# Patient Record
Sex: FEMALE | Race: WHITE
[De-identification: names, ages, dates, MRNs, and addresses within clinical notes are randomized per-mention and may not be internally consistent; named-entity substitution may affect disease eponyms.]

---

## 2018-07-29 ENCOUNTER — HOSPITAL ENCOUNTER (INPATIENT)
Dept: HOSPITAL 45 - C.EDB | Age: 58
LOS: 6 days | Discharge: HOME | DRG: 314 | End: 2018-08-04
Attending: HOSPITALIST | Admitting: HOSPITALIST
Payer: COMMERCIAL

## 2018-07-29 VITALS
BODY MASS INDEX: 45.99 KG/M2 | BODY MASS INDEX: 45.99 KG/M2 | BODY MASS INDEX: 45.99 KG/M2 | WEIGHT: 293 LBS | BODY MASS INDEX: 45.99 KG/M2 | HEIGHT: 67 IN | WEIGHT: 293 LBS | HEIGHT: 67 IN

## 2018-07-29 DIAGNOSIS — I26.99: ICD-10-CM

## 2018-07-29 DIAGNOSIS — I13.0: ICD-10-CM

## 2018-07-29 DIAGNOSIS — J96.22: ICD-10-CM

## 2018-07-29 DIAGNOSIS — J44.1: ICD-10-CM

## 2018-07-29 DIAGNOSIS — E11.22: ICD-10-CM

## 2018-07-29 DIAGNOSIS — R13.10: ICD-10-CM

## 2018-07-29 DIAGNOSIS — N18.3: ICD-10-CM

## 2018-07-29 DIAGNOSIS — Z91.19: ICD-10-CM

## 2018-07-29 DIAGNOSIS — F17.200: ICD-10-CM

## 2018-07-29 DIAGNOSIS — J96.21: ICD-10-CM

## 2018-07-29 DIAGNOSIS — I27.29: ICD-10-CM

## 2018-07-29 DIAGNOSIS — I27.24: Primary | ICD-10-CM

## 2018-07-29 DIAGNOSIS — Z51.81: ICD-10-CM

## 2018-07-29 DIAGNOSIS — I50.813: ICD-10-CM

## 2018-07-29 DIAGNOSIS — E87.5: ICD-10-CM

## 2018-07-29 DIAGNOSIS — E87.2: ICD-10-CM

## 2018-07-29 DIAGNOSIS — Z86.718: ICD-10-CM

## 2018-07-29 DIAGNOSIS — Z79.899: ICD-10-CM

## 2018-07-29 DIAGNOSIS — R53.81: ICD-10-CM

## 2018-07-29 DIAGNOSIS — Z82.49: ICD-10-CM

## 2018-07-29 DIAGNOSIS — N17.9: ICD-10-CM

## 2018-07-29 DIAGNOSIS — I50.31: ICD-10-CM

## 2018-07-29 DIAGNOSIS — Z83.3: ICD-10-CM

## 2018-07-29 DIAGNOSIS — Z66: ICD-10-CM

## 2018-07-29 DIAGNOSIS — Z79.84: ICD-10-CM

## 2018-07-29 DIAGNOSIS — G89.29: ICD-10-CM

## 2018-07-29 DIAGNOSIS — E66.2: ICD-10-CM

## 2018-07-29 DIAGNOSIS — Z80.3: ICD-10-CM

## 2018-07-29 LAB
BASOPHILS # BLD: 0.04 K/UL (ref 0–0.2)
BASOPHILS NFR BLD: 0.4 %
BUN SERPL-MCNC: 82 MG/DL (ref 7–18)
CALCIUM SERPL-MCNC: 8.9 MG/DL (ref 8.5–10.1)
CO2 SERPL-SCNC: 26 MMOL/L (ref 21–32)
CREAT SERPL-MCNC: 1.84 MG/DL (ref 0.6–1.2)
EOS ABS #: 0.15 K/UL (ref 0–0.5)
EOSINOPHIL NFR BLD AUTO: 274 K/UL (ref 130–400)
GLUCOSE SERPL-MCNC: 99 MG/DL (ref 70–99)
HCT VFR BLD CALC: 48.8 % (ref 37–47)
HGB BLD-MCNC: 15.4 G/DL (ref 12–16)
IG#: 0.03 K/UL (ref 0–0.02)
IMM GRANULOCYTES NFR BLD AUTO: 25.3 %
LYMPHOCYTES # BLD: 2.87 K/UL (ref 1.2–3.4)
MCH RBC QN AUTO: 30.4 PG (ref 25–34)
MCHC RBC AUTO-ENTMCNC: 31.6 G/DL (ref 32–36)
MCV RBC AUTO: 96.3 FL (ref 80–100)
MONO ABS #: 0.95 K/UL (ref 0.11–0.59)
MONOCYTES NFR BLD: 8.4 %
NEUT ABS #: 7.3 K/UL (ref 1.4–6.5)
NEUTROPHILS # BLD AUTO: 1.3 %
NEUTROPHILS NFR BLD AUTO: 64.3 %
PMV BLD AUTO: 9.9 FL (ref 7.4–10.4)
POTASSIUM SERPL-SCNC: 5.9 MMOL/L (ref 3.5–5.1)
RED CELL DISTRIBUTION WIDTH CV: 16.7 % (ref 11.5–14.5)
RED CELL DISTRIBUTION WIDTH SD: 58.3 FL (ref 36.4–46.3)
SODIUM SERPL-SCNC: 137 MMOL/L (ref 136–145)
WBC # BLD AUTO: 11.34 K/UL (ref 4.8–10.8)

## 2018-07-29 NOTE — DIAGNOSTIC IMAGING REPORT
CHEST ONE VIEW PORTABLE



CLINICAL HISTORY: CHEST PAIN dyspnea



COMPARISON STUDY:  No previous studies for comparison.



FINDINGS: Moderate cardiomegaly. Prominent pulmonary vasculature. Diaphragms are

smooth. 



IMPRESSION:  Congestive heart failure 











The above report was generated using voice recognition software.  It may contain

grammatical, syntax or spelling errors.









Electronically signed by:  Robert Thao M.D.

7/29/2018 8:24 PM



Dictated Date/Time:  7/29/2018 8:24 PM

## 2018-07-29 NOTE — EMERGENCY ROOM VISIT NOTE
History


Report prepared by Claudia:  Shen Cardona


Under the Supervision of:  Dr. Jose Antonio Nolasco M.D.


First contact with patient:  19:36


Chief Complaint:  SHORTNESS OF BREATH


Stated Complaint:  SOB,RETAINING FLUID





History of Present Illness


The patient is a 58 year old female who presents to the Emergency Room with 

complaints of worsening SOB that began recently. Patient adds she has been 

weaker than usual lately. She states that has not used oxygen in the past 

couple of weeks since it was not helping her symptoms.  She was prescribed home 

oxygen by her primary care physician in [place] as needed, she does not 

wear oxygen regularly.  Patient adds she has increased "fluid" in her abdomen 

which has "doubled in size" over the past 2 weeks. She states she also has 

generalized pain. Patient adds she takes Metformin, Genvoya, glimepiride, 

Aspirin, Percocet, Torsemide, and Lisinopril. Patient adds she uses Advair, an 

inhaler daily, and daily breathing treatments. She states the breathing 

treatments have not helped her breathing problems. Patient states she has been 

taking all her medications. Patient states she is a smoker. Patient denies ever 

being on a ventilator, intubated, or in the ICU. Patient denies any history of 

liver problems. She denies taking any blood thinners. Patient states she does 

not have a cardiologist. She adds she lives in [place]. Patient states 

she got to [place] 2 weeks ago as a passenger in a car.





   Source of History:  patient


   Onset:  Recent


   Position:  chest


   Timing:  worsening


   Modifying Factors (Relieving):  other (None)


   Associated Symptoms:  + weakness


Note:


Positive abdominal "fluid" and generalized pain.





Review of Systems


See HPI for pertinent positives and negatives.  A total of ten systems were 

reviewed and were otherwise negative.





Past Medical & Surgical


Medical Problems:


(1) Acute CHF


(2) Acute respiratory failure


(3) Asthma


(4) Bronchitis


(5) Diabetes


(6) Emphysema of lung


(7) Hypertension


(8) Pneumonia


(9) Stomach problems








Family History





Cancer


Diabetes mellitus


Heart disease


Hypertension





Social History


Smoking Status:  Current Every Day Smoker


Alcohol Use:  none


Marital Status:  


Occupation Status:  disabled





Current/Historical Medications


Scheduled


Fluticasone-Salmeterol 230/21 Mcg (Advair Hfa 230/21 Mcg), 2 PUFFS INH BID


Glimepiride (Amaryl), 4 MG PO BID


Home O2 Therapy (Oxygen), 2-3 LITERS NA UD


Lisinopril (Zestril), 40 MG PO DAILY


Metformin Hcl (Glucophage), 850 MG PO TID


Sitagliptin Phosphate (Januvia), 100 MG PO DAILY


Umeclidinium Bromide (Incruse Ellipta), 1 INHA INH DAILY





Scheduled PRN


Furosemide (Lasix), 40 MG PO BID PRN for Fluid Retention


Gabapentin (Neurontin), 800 MG PO BID PRN for Pain


Ibuprofen Tab (Motrin), 1,600 MG PO BID PRN for Pain


Oxycodone/Acetaminophen 7.5MG/325MG (Oxycodone/Acetaminophen 7.5MG/325MG), 1 

TAB PO Q8 PRN for Pain





Allergies


Coded Allergies:  


     No Known Allergies (Unverified , 8/14/12)





Physical Exam


Vital Signs











  Date Time  Temp Pulse Resp B/P (MAP) Pulse Ox O2 Delivery O2 Flow Rate FiO2


 


7/29/18 23:31  84 22 129/72 93 Nasal Cannula 6.0 


 


7/29/18 23:01  84  103/65 92 Nasal Cannula 6.0 


 


7/29/18 22:00  81  117/73 92 Nasal Cannula 6.0 


 


7/29/18 21:21  75 23 114/80 93 Nasal Cannula 6.0 


 


7/29/18 20:13  78      


 


7/29/18 19:55     92 Nasal Cannula 6.0 


 


7/29/18 19:55  82 30 110/70 93 Nasal Cannula 6.0 


 


7/29/18 19:52     93 Nasal Cannula 6.0 


 


7/29/18 19:52     93 Nasal Cannula 6.0 


 


7/29/18 19:32 36.8 81 26  83 Room Air  











Physical Exam


Physical Exam 


GENERAL:  She is oriented to person, place, and time.  


HENT:  Exam performed. 


   Head:  Normocephalic and atraumatic. 


   Right Ear:  External ear normal. No mastoid tenderness. 


   Left Ear: External ear normal. No mastoid tenderness. 


   Mouth/Throat:  The oropharynx is clear and moist. No trismus in the jaw. No 

dental abscesses or uvula swelling. No oropharyngeal exudate or tonsillar 

abscesses.


EYES: Conjunctivae and EOM are normal. Pupils are equal, round, and reactive to 

light. Right eye exhibits no discharge. Left eye exhibits no discharge. No 

scleral icterus.


NECK: Normal range of motion. Neck supple. No JVD present. No spinous process 

tenderness present. No carotid bruit present. No rigidity. No tracheal 

deviation and normal range of motion present. No Brudzinski's sign and no Kernig

's sign noted.


CV: Normal rate, regular rhythm, normal heart sounds and intact distal pulses. 

There is no peripheral edema. Palpable radial pulses bue.


PULM/CHEST:  Diffuse expiratory wheezes otherwise effort normal and breath 

sounds normal.  No stridor. She has no rales. 


   Chest Wall:  She exhibits no tenderness.


ABD: Morbidly obese otherwise the abdomen is soft. Bowel sounds are normal. She 

has no distension. No mass is present. There is no tenderness. There is no 

rebound, no guarding, no Gautam's sign and no tenderness at McBurney's point. 

Rovsig negative


MUSC/SKEL: 2+ bilateral pitting edema otherwise normal range of motion. There 

is no tenderness or deformity.


LYMPH: No cervical adenopathy.


NEURO: She is alert and oriented to person, place, and time. She has normal 

strength. No cranial nerve deficit or sensory deficit. Coordination and gait 

normal. GCS eye subscore is 4. GCS verbal subscore is 5. GCS motor subscore is 

6. Cerebellar tests wnl.


SKIN: Skin is warm and dry. She is not diaphoretic.


PSYCH: She has a normal mood and affect. Behavior is normal. Judgment and 

thought content normal.





Medical Decision & Procedures


ER Provider


Diagnostic Interpretation:


Radiology results as stated below per my review and radiologist interpretation: 





CHEST ONE VIEW PORTABLE





CLINICAL HISTORY: CHEST PAIN dyspnea





COMPARISON STUDY:  No previous studies for comparison.





FINDINGS: Moderate cardiomegaly. Prominent pulmonary vasculature. Diaphragms are


smooth. 





IMPRESSION:  Congestive heart failure 





The above report was generated using voice recognition software.  It may contain


grammatical, syntax or spelling errors.





Electronically signed by:  Robert Thao M.D.


7/29/2018 8:24 PM





Laboratory Results


7/29/18 21:02








Red Blood Count 5.07, Mean Corpuscular Volume 96.3, Mean Corpuscular Hemoglobin 

30.4, Mean Corpuscular Hemoglobin Concent 31.6, Mean Platelet Volume 9.9, 

Neutrophils (%) (Auto) 64.3, Lymphocytes (%) (Auto) 25.3, Monocytes (%) (Auto) 

8.4, Eosinophils (%) (Auto) 1.3, Basophils (%) (Auto) 0.4, Neutrophils # (Auto) 

7.30, Lymphocytes # (Auto) 2.87, Monocytes # (Auto) 0.95, Eosinophils # (Auto) 

0.15, Basophils # (Auto) 0.04





7/29/18 21:02

















Test


  7/29/18


21:02 7/29/18


21:48


 


White Blood Count


  11.34 K/uL


(4.8-10.8) 


 


 


Red Blood Count


  5.07 M/uL


(4.2-5.4) 


 


 


Hemoglobin


  15.4 g/dL


(12.0-16.0) 


 


 


Hematocrit 48.8 % (37-47)  


 


Mean Corpuscular Volume


  96.3 fL


() 


 


 


Mean Corpuscular Hemoglobin


  30.4 pg


(25-34) 


 


 


Mean Corpuscular Hemoglobin


Concent 31.6 g/dl


(32-36) 


 


 


Platelet Count


  274 K/uL


(130-400) 


 


 


Mean Platelet Volume


  9.9 fL


(7.4-10.4) 


 


 


Neutrophils (%) (Auto) 64.3 %  


 


Lymphocytes (%) (Auto) 25.3 %  


 


Monocytes (%) (Auto) 8.4 %  


 


Eosinophils (%) (Auto) 1.3 %  


 


Basophils (%) (Auto) 0.4 %  


 


Neutrophils # (Auto)


  7.30 K/uL


(1.4-6.5) 


 


 


Lymphocytes # (Auto)


  2.87 K/uL


(1.2-3.4) 


 


 


Monocytes # (Auto)


  0.95 K/uL


(0.11-0.59) 


 


 


Eosinophils # (Auto)


  0.15 K/uL


(0-0.5) 


 


 


Basophils # (Auto)


  0.04 K/uL


(0-0.2) 


 


 


RDW Standard Deviation


  58.3 fL


(36.4-46.3) 


 


 


RDW Coefficient of Variation


  16.7 %


(11.5-14.5) 


 


 


Immature Granulocyte % (Auto) 0.3 %  


 


Immature Granulocyte # (Auto)


  0.03 K/uL


(0.00-0.02) 


 


 


Anion Gap


  6.0 mmol/L


(3-11) 


 


 


Est Creatinine Clear Calc


Drug Dose 56.4 ml/min 


  


 


 


Estimated GFR (


American) 34.9 


  


 


 


Estimated GFR (Non-


American 30.1 


  


 


 


BUN/Creatinine Ratio 44.3 (10-20)  


 


Calcium Level


  8.9 mg/dl


(8.5-10.1) 


 


 


Troponin I


  < 0.015 ng/ml


(0-0.045) 


 


 


Pro-B-Type Natriuretic Peptide


  7696 pg/ml


(0-900) 


 


 


Venous Blood pH


  


  7.20


(7.36-7.41)


 


Venous Blood Partial Pressure


CO2 


  69 mmHg


(38.0-50.0)


 


Venous Blood Partial Pressure


O2 


  42 mmHg 


 


 


Venous Blood HCO3  27 mmol/L 


 


Venous Blood Oxygen Saturation  71.1 % 


 


Venous Blood Base Excess  -2.8 mEq/L 





Laboratory results reviewed by me





Medications Administered











 Medications


  (Trade)  Dose


 Ordered  Sig/Vivi


 Route  Start Time


 Stop Time Status Last Admin


Dose Admin


 


 Albuterol/


 Ipratropium


  (Duoneb)  3 ml  NOW  STAT


 INH  7/29/18 19:40


 7/29/18 19:43 DC 7/29/18 20:17


3 ML


 


 Methylprednisolone


 Sodium Succinate


  (Solu-Medrol IV)  125 mg  NOW  STAT


 IV  7/29/18 19:40


 7/29/18 19:43 DC 7/29/18 21:14


125 MG


 


 Albuterol/


 Ipratropium


  (Duoneb)  3 ml  NOW  STAT


 INH  7/29/18 21:38


 7/29/18 21:40 DC 7/29/18 22:51


3 ML


 


 Calcium Gluconate


 1000 mg/Sodium


 Chloride  60 ml @ 


 240 mls/hr  NOW  STAT


 IV  7/29/18 21:55


 7/29/18 22:09 DC 7/29/18 22:50


240 MLS/HR


 


 Dextrose


  (Dextrose 50%


 50ML Syringe)  50 ml  NOW  STAT


 IV  7/29/18 21:55


 7/29/18 21:57 DC 7/29/18 22:42


50 ML


 


 Sodium


 Polystyrene


 Sulfonate


  (Kayexalate Susp)  15 gm  NOW  STAT


 PO  7/29/18 21:55


 7/29/18 21:58 DC 7/29/18 22:38


15 GM


 


 Furosemide


  (Lasix Inj)  40 mg  STK-MED ONCE


 .ROUTE  7/29/18 22:18


 7/29/18 22:19 DC 7/29/18 22:41


40 MG


 


 Insulin Human


 Regular


  (novoLIN-R U-100


 PER UNIT)  5 units  STK-MED ONCE


 .ROUTE  7/29/18 22:20


 7/29/18 22:21 DC 7/29/18 22:44


5 UNITS











ECG Per My Interpretation


Indication:  SOB/dyspnea


Rate (beats per minute):  77


Rhythm:  sinus rhythm


Findings:  T-wave inversion (T-wave inversion in leads 3-6 and AVF), other (ME 

QRS and QTc are within normal limits)





ED Course


1932: The patient was evaluated in room A10. A complete history and physical 

exam was performed.  Patient was immediately placed on oxygen as well as 

cardiac monitor.





1940: Solu-Medrol IV 125mg IV and Duoneb 3ml INH





2031: Patient's oxygen saturation levels are stable on 6L. Patient states she 

had mild improvement with breathing treatment.





2138: Duoneb 3ml INH





2139: I reassessed the patient. Upon re-examination, lungs still show 

diminished breath sounds, Patient states she could she could use another 

breathing treatment which will be administered. CXR shows CHF and fluid 

overload. VBG shows respiratory acidosis. CO2 level at 71. pH at 7.2. A repeat 

VBG will be performed to show no worsening respiratory acidosis.





2155: Kayexalate Susp 15gm PO, Furosemide 40mg/Syringe 4ml @ 4mls/min IV, 

Dextrose 50ml IV, Insulin Human Regular 10 units/Syringe 10ml @ 20mls/min IV, 

and Calcium Gluconate 1000mg/Sodium Chloride 60 ml @ 240 mls/hr IV





2214: I reevaluated the patient. Labs show acute kidney injury, BUN 82, and 

creatinine is 1.81. Patient denies a history of kidney problem. Pro BNP is 

markedly elevated. CXR shows fluid overload. Potassium is 5.9. VBG is stable. 

Patient will be treated calcium gluconate, 5 U insulin, 1 amp D50W, Kayexalate. 

[facility] I 

discussed the test results and treatment plan with the patient. She is 

agreeable to the treatment plan. The patient will be evaluated for further 

management.





Medical Decision


1932: The patient was evaluated in room A10. A complete history and physical 

exam was performed.  Patient was immediately placed on oxygen as well as 

cardiac monitor.





1940: Solu-Medrol IV 125mg IV and Duoneb 3ml INH





2031: Patient's oxygen saturation levels are stable on 6L. Patient states she 

had mild improvement with breathing treatment.





2138: Duoneb 3ml INH





2139: I reassessed the patient. Upon re-examination, lungs still show 

diminished breath sounds, Patient states she could she could use another 

breathing treatment which will be administered. CXR shows CHF and fluid 

overload. VBG shows respiratory acidosis. CO2 level at 71. pH at 7.2. A repeat 

VBG will be performed to show no worsening respiratory acidosis.





2155: Kayexalate Susp 15gm PO, Furosemide 40mg/Syringe 4ml @ 4mls/min IV, 

Dextrose 50ml IV, Insulin Human Regular 10 units/Syringe 10ml @ 20mls/min IV, 

and Calcium Gluconate 1000mg/Sodium Chloride 60 ml @ 240 mls/hr IV





2214: I reevaluated the patient. Labs show acute kidney injury, BUN 82, and 

creatinine is 1.81. Patient denies a history of kidney problem. Pro BNP is 

markedly elevated. CXR shows fluid overload. Potassium is 5.9. VBG is stable. 

Patient will be treated calcium gluconate, 5 U insulin, 1 amp D50W, Kayexalate. 

Patient [facility] I 

discussed the test results and treatment plan with the patient. She is 

agreeable to the treatment plan. The patient will be evaluated for further 

management.





Medication Reconcilliation


Current Medication List:  was personally reviewed by me





Blood Pressure Screening


Patient's blood pressure:  Normal blood pressure


Blood pressure disposition:  Did not require urgent referral





Consults


Time Called:  2203


Consulting Physician:  Dr. Reyna - [facility]


Returned Call:  [phone]


Discussed the patient's case. The patient will be evaluated for further 

treatment and disposition.





Impression





 Primary Impression:  


 Hypoxia


 Additional Impressions:  


 Respiratory acidosis


 CHF exacerbation


 COPD exacerbation


 Acute kidney injury


 Hypokalemia





Critical Care


I have personally spent greater than 84 minutes of critical care time in the 

direct management of this patient.  This includes bedside care, interpretation 

of diagnostic studies, and testing, discussion with consultants, patient, and 

family members, and other required patient management activities.  This 84 

minutes is in excess of all separately billable procedures.





Scribe Attestation


The scribe's documentation has been prepared under my direction and personally 

reviewed by me in its entirety. I confirm that the note above accurately 

reflects all work, treatment, procedures, and medical decision making performed 

by me.





The chart was completed utilizing Dragon Speech voice recognition software. 

Grammatical errors, random word insertions, pronoun errors, and incomplete 

sentences are an occasional consequence of this system due to software 

limitations, ambient noise, and hardware issues. Any formal questions or 

concerns about the content, text, or information contained within the body of 

this dictation should be directly addressed to the physician for clarification.





Departure Information


Dispostion


Being Evaluated By Hospitalist





Referrals


No Doctor, Assigned (PCP)





Forms


HOME CARE DOCUMENTATION FORM,                                                 

               IMPORTANT VISIT INFORMATION





Patient Instructions


[facility]





Problem Qualifiers








 Additional Impressions:  


 CHF exacerbation


 Heart failure type:  unspecified  Qualified Codes:  I50.9 - Heart failure, 

unspecified

## 2018-07-30 VITALS
TEMPERATURE: 97.52 F | SYSTOLIC BLOOD PRESSURE: 109 MMHG | OXYGEN SATURATION: 97 % | DIASTOLIC BLOOD PRESSURE: 56 MMHG | HEART RATE: 70 BPM

## 2018-07-30 VITALS — DIASTOLIC BLOOD PRESSURE: 51 MMHG | OXYGEN SATURATION: 99 % | HEART RATE: 75 BPM | SYSTOLIC BLOOD PRESSURE: 118 MMHG

## 2018-07-30 VITALS — OXYGEN SATURATION: 98 % | HEART RATE: 76 BPM

## 2018-07-30 VITALS — OXYGEN SATURATION: 97 % | HEART RATE: 70 BPM

## 2018-07-30 VITALS — SYSTOLIC BLOOD PRESSURE: 85 MMHG | DIASTOLIC BLOOD PRESSURE: 48 MMHG | OXYGEN SATURATION: 90 % | HEART RATE: 72 BPM

## 2018-07-30 VITALS
SYSTOLIC BLOOD PRESSURE: 140 MMHG | HEART RATE: 76 BPM | OXYGEN SATURATION: 99 % | DIASTOLIC BLOOD PRESSURE: 61 MMHG | TEMPERATURE: 98.6 F

## 2018-07-30 VITALS — OXYGEN SATURATION: 91 %

## 2018-07-30 VITALS — SYSTOLIC BLOOD PRESSURE: 83 MMHG | HEART RATE: 75 BPM | OXYGEN SATURATION: 90 % | DIASTOLIC BLOOD PRESSURE: 43 MMHG

## 2018-07-30 VITALS — HEART RATE: 73 BPM | SYSTOLIC BLOOD PRESSURE: 133 MMHG | OXYGEN SATURATION: 94 % | DIASTOLIC BLOOD PRESSURE: 50 MMHG

## 2018-07-30 VITALS — HEART RATE: 87 BPM | OXYGEN SATURATION: 91 %

## 2018-07-30 VITALS — HEART RATE: 74 BPM

## 2018-07-30 VITALS — OXYGEN SATURATION: 95 % | HEART RATE: 74 BPM

## 2018-07-30 VITALS — SYSTOLIC BLOOD PRESSURE: 98 MMHG | HEART RATE: 80 BPM | DIASTOLIC BLOOD PRESSURE: 45 MMHG | OXYGEN SATURATION: 94 %

## 2018-07-30 VITALS
DIASTOLIC BLOOD PRESSURE: 69 MMHG | SYSTOLIC BLOOD PRESSURE: 129 MMHG | OXYGEN SATURATION: 96 % | TEMPERATURE: 98.24 F | HEART RATE: 78 BPM

## 2018-07-30 VITALS — OXYGEN SATURATION: 93 % | HEART RATE: 87 BPM

## 2018-07-30 VITALS — HEART RATE: 73 BPM | SYSTOLIC BLOOD PRESSURE: 99 MMHG | OXYGEN SATURATION: 90 % | DIASTOLIC BLOOD PRESSURE: 45 MMHG

## 2018-07-30 VITALS
SYSTOLIC BLOOD PRESSURE: 96 MMHG | HEART RATE: 86 BPM | TEMPERATURE: 98.06 F | OXYGEN SATURATION: 93 % | DIASTOLIC BLOOD PRESSURE: 55 MMHG

## 2018-07-30 VITALS — HEART RATE: 82 BPM | DIASTOLIC BLOOD PRESSURE: 61 MMHG | OXYGEN SATURATION: 94 % | SYSTOLIC BLOOD PRESSURE: 119 MMHG

## 2018-07-30 VITALS
HEART RATE: 83 BPM | SYSTOLIC BLOOD PRESSURE: 132 MMHG | TEMPERATURE: 97.52 F | DIASTOLIC BLOOD PRESSURE: 79 MMHG | OXYGEN SATURATION: 91 %

## 2018-07-30 VITALS
DIASTOLIC BLOOD PRESSURE: 58 MMHG | SYSTOLIC BLOOD PRESSURE: 121 MMHG | OXYGEN SATURATION: 96 % | HEART RATE: 78 BPM | TEMPERATURE: 97.34 F

## 2018-07-30 VITALS — HEART RATE: 82 BPM | OXYGEN SATURATION: 95 %

## 2018-07-30 VITALS
SYSTOLIC BLOOD PRESSURE: 102 MMHG | TEMPERATURE: 98.24 F | HEART RATE: 87 BPM | DIASTOLIC BLOOD PRESSURE: 64 MMHG | OXYGEN SATURATION: 90 %

## 2018-07-30 VITALS — HEART RATE: 81 BPM | OXYGEN SATURATION: 99 %

## 2018-07-30 VITALS — OXYGEN SATURATION: 96 %

## 2018-07-30 LAB
ALBUMIN SERPL-MCNC: 3.1 GM/DL (ref 3.4–5)
ALP SERPL-CCNC: 78 U/L (ref 45–117)
ALT SERPL-CCNC: 38 U/L (ref 12–78)
AST SERPL-CCNC: 25 U/L (ref 15–37)
BASOPHILS # BLD: 0 K/UL (ref 0–0.2)
BASOPHILS # BLD: 0.01 K/UL (ref 0–0.2)
BASOPHILS NFR BLD: 0 %
BASOPHILS NFR BLD: 0.1 %
BUN SERPL-MCNC: 80 MG/DL (ref 7–18)
BUN SERPL-MCNC: 81 MG/DL (ref 7–18)
BUN SERPL-MCNC: 83 MG/DL (ref 7–18)
CALCIUM SERPL-MCNC: 8.3 MG/DL (ref 8.5–10.1)
CALCIUM SERPL-MCNC: 8.4 MG/DL (ref 8.5–10.1)
CALCIUM SERPL-MCNC: 9 MG/DL (ref 8.5–10.1)
CO2 SERPL-SCNC: 24 MMOL/L (ref 21–32)
CO2 SERPL-SCNC: 26 MMOL/L (ref 21–32)
CO2 SERPL-SCNC: 27 MMOL/L (ref 21–32)
CREAT SERPL-MCNC: 1.52 MG/DL (ref 0.6–1.2)
CREAT SERPL-MCNC: 1.69 MG/DL (ref 0.6–1.2)
CREAT SERPL-MCNC: 1.75 MG/DL (ref 0.6–1.2)
EOS ABS #: 0 K/UL (ref 0–0.5)
EOS ABS #: 0.01 K/UL (ref 0–0.5)
EOSINOPHIL NFR BLD AUTO: 241 K/UL (ref 130–400)
EOSINOPHIL NFR BLD AUTO: 258 K/UL (ref 130–400)
GLUCOSE SERPL-MCNC: 224 MG/DL (ref 70–99)
GLUCOSE SERPL-MCNC: 251 MG/DL (ref 70–99)
GLUCOSE SERPL-MCNC: 257 MG/DL (ref 70–99)
HBA1C MFR BLD: 9.9 % (ref 4.5–5.6)
HCT VFR BLD CALC: 46.4 % (ref 37–47)
HCT VFR BLD CALC: 47 % (ref 37–47)
HGB BLD-MCNC: 14.3 G/DL (ref 12–16)
HGB BLD-MCNC: 14.3 G/DL (ref 12–16)
IG#: 0.06 K/UL (ref 0–0.02)
IG#: 0.06 K/UL (ref 0–0.02)
IMM GRANULOCYTES NFR BLD AUTO: 4 %
IMM GRANULOCYTES NFR BLD AUTO: 8.2 %
INR PPP: 1.1 (ref 0.9–1.1)
INR PPP: 1.1 (ref 0.9–1.1)
KETONES UR QL STRIP: 84 MG/DL
LYMPHOCYTES # BLD: 0.36 K/UL (ref 1.2–3.4)
LYMPHOCYTES # BLD: 0.64 K/UL (ref 1.2–3.4)
MCH RBC QN AUTO: 29.9 PG (ref 25–34)
MCH RBC QN AUTO: 30.2 PG (ref 25–34)
MCHC RBC AUTO-ENTMCNC: 30.4 G/DL (ref 32–36)
MCHC RBC AUTO-ENTMCNC: 30.8 G/DL (ref 32–36)
MCV RBC AUTO: 98.1 FL (ref 80–100)
MCV RBC AUTO: 98.3 FL (ref 80–100)
MONO ABS #: 0.03 K/UL (ref 0.11–0.59)
MONO ABS #: 0.21 K/UL (ref 0.11–0.59)
MONOCYTES NFR BLD: 0.3 %
MONOCYTES NFR BLD: 2.7 %
NEUT ABS #: 6.92 K/UL (ref 1.4–6.5)
NEUT ABS #: 8.46 K/UL (ref 1.4–6.5)
NEUTROPHILS # BLD AUTO: 0 %
NEUTROPHILS # BLD AUTO: 0.1 %
NEUTROPHILS NFR BLD AUTO: 88.2 %
NEUTROPHILS NFR BLD AUTO: 94.9 %
PH UR: 133 MG/DL (ref 0–200)
PMV BLD AUTO: 10 FL (ref 7.4–10.4)
PMV BLD AUTO: 10.3 FL (ref 7.4–10.4)
POTASSIUM SERPL-SCNC: 5.6 MMOL/L (ref 3.5–5.1)
POTASSIUM SERPL-SCNC: 5.7 MMOL/L (ref 3.5–5.1)
POTASSIUM SERPL-SCNC: 6 MMOL/L (ref 3.5–5.1)
PROT SERPL-MCNC: 8 GM/DL (ref 6.4–8.2)
PTT PATIENT: 24.9 SECONDS (ref 21–31)
RED CELL DISTRIBUTION WIDTH CV: 16.6 % (ref 11.5–14.5)
RED CELL DISTRIBUTION WIDTH CV: 16.6 % (ref 11.5–14.5)
RED CELL DISTRIBUTION WIDTH SD: 59.2 FL (ref 36.4–46.3)
RED CELL DISTRIBUTION WIDTH SD: 59.3 FL (ref 36.4–46.3)
SODIUM SERPL-SCNC: 135 MMOL/L (ref 136–145)
SODIUM SERPL-SCNC: 135 MMOL/L (ref 136–145)
SODIUM SERPL-SCNC: 136 MMOL/L (ref 136–145)
WBC # BLD AUTO: 7.84 K/UL (ref 4.8–10.8)
WBC # BLD AUTO: 8.92 K/UL (ref 4.8–10.8)

## 2018-07-30 RX ADMIN — ALUMINUM ZIRCONIUM TRICHLOROHYDREX GLY SCH EA: 0.2 STICK TOPICAL at 15:01

## 2018-07-30 RX ADMIN — INSULIN ASPART SCH UNITS: 100 INJECTION, SOLUTION INTRAVENOUS; SUBCUTANEOUS at 21:11

## 2018-07-30 RX ADMIN — IPRATROPIUM BROMIDE AND ALBUTEROL SULFATE SCH ML: .5; 3 SOLUTION RESPIRATORY (INHALATION) at 11:15

## 2018-07-30 RX ADMIN — NYSTATIN SCH APPLN: 100000 OINTMENT TOPICAL at 21:04

## 2018-07-30 RX ADMIN — IPRATROPIUM BROMIDE AND ALBUTEROL SULFATE SCH ML: .5; 3 SOLUTION RESPIRATORY (INHALATION) at 19:40

## 2018-07-30 RX ADMIN — OXYCODONE AND ACETAMINOPHEN PRN TAB: 7.5; 325 TABLET ORAL at 21:19

## 2018-07-30 RX ADMIN — ALUMINUM ZIRCONIUM TRICHLOROHYDREX GLY SCH EA: 0.2 STICK TOPICAL at 15:02

## 2018-07-30 RX ADMIN — INSULIN ASPART SCH UNITS: 100 INJECTION, SOLUTION INTRAVENOUS; SUBCUTANEOUS at 16:35

## 2018-07-30 RX ADMIN — ALUMINUM ZIRCONIUM TRICHLOROHYDREX GLY SCH EA: 0.2 STICK TOPICAL at 08:00

## 2018-07-30 RX ADMIN — INSULIN GLARGINE SCH UNITS: 100 INJECTION, SOLUTION SUBCUTANEOUS at 21:12

## 2018-07-30 RX ADMIN — ALUMINUM ZIRCONIUM TRICHLOROHYDREX GLY SCH EA: 0.2 STICK TOPICAL at 21:12

## 2018-07-30 RX ADMIN — INSULIN ASPART SCH UNITS: 100 INJECTION, SOLUTION INTRAVENOUS; SUBCUTANEOUS at 12:01

## 2018-07-30 RX ADMIN — LEVOFLOXACIN SCH MLS/HR: 5 INJECTION, SOLUTION INTRAVENOUS at 02:06

## 2018-07-30 RX ADMIN — INSULIN ASPART SCH UNITS: 100 INJECTION, SOLUTION INTRAVENOUS; SUBCUTANEOUS at 08:11

## 2018-07-30 RX ADMIN — HEPARIN SODIUM SCH MLS/HR: 5000 INJECTION, SOLUTION INTRAVENOUS at 18:58

## 2018-07-30 RX ADMIN — NYSTATIN SCH APPLN: 100000 OINTMENT TOPICAL at 08:09

## 2018-07-30 RX ADMIN — HEPARIN SODIUM SCH UNIT: 10000 INJECTION, SOLUTION INTRAVENOUS; SUBCUTANEOUS at 06:15

## 2018-07-30 RX ADMIN — IPRATROPIUM BROMIDE AND ALBUTEROL SULFATE SCH ML: .5; 3 SOLUTION RESPIRATORY (INHALATION) at 14:48

## 2018-07-30 RX ADMIN — IPRATROPIUM BROMIDE AND ALBUTEROL SULFATE SCH ML: .5; 3 SOLUTION RESPIRATORY (INHALATION) at 07:07

## 2018-07-30 RX ADMIN — INSULIN GLARGINE SCH UNITS: 100 INJECTION, SOLUTION SUBCUTANEOUS at 08:12

## 2018-07-30 RX ADMIN — HYDRALAZINE HYDROCHLORIDE PRN MG: 10 TABLET ORAL at 08:08

## 2018-07-30 RX ADMIN — HEPARIN SODIUM SCH UNIT: 10000 INJECTION, SOLUTION INTRAVENOUS; SUBCUTANEOUS at 15:00

## 2018-07-30 NOTE — HISTORY & PHYSICAL EXAMINATION
DATE OF ADMISSION:  07/29/2018

 

CHIEF COMPLAINT:  Shortness of breath.

 

HISTORY OF PRESENT ILLNESS:  This is a 58-year-old female with past medical

history significant for morbid obesity, obstructive sleep apnea, but

noncompliant with CPAP, diabetes, hypertension, COPD, tobacco abuse. 

Presents with shortness of breath.  Patient is from [place].  Her

house is getting repaired and that is why she came to live with her 

 niece in [place] since about a month.  Before coming to [place], she was in ER in [place]  and she was told she has CHF

and discharged on Lasix tablet prn and she was not seen by any cardiologist.  
She

states since that time she is progressively getting weight gain and

increasing lower extremity edema, but last 1 week it got progressive worse

and she was getting short of breath even on ambulating few steps, she was

getting nauseous, somewhat dizzy, feeling weak and tired.    She has some cough 
with occasional

white phlegm . All these symptoms prompted her to come to the ER.  In the ER, 
she was

saturating 83% on room air and with  oxygen 6 L she was saturating at 93%

to 94%.  She is worried about her weight gain.  She is feeling very weak and

tired.  She says her abdominal girth is much increased.  She says she gained

about 70 pounds from last May.  Denies any headaches, no blurred visions, no

earache, no runny nose, no sore throat.  She also complained of some

difficulty swallowing.  She says whenever she has Arvizu's, she feels her

food gets stuck in mid of her esophagus and this is going on for last 2

years and she was told that she may need esophageal dilatation and she is

managing with eating small bites.  No abdominal pain.  Normal bowel

movements.  No blood in the stools.  She says some days she micturates a lot and

some days not at all, but no blood in the urine.

 

ALLERGIES:  No known drug allergies.

 

PAST MEDICAL HISTORY:  As mentioned above.

 

PAST SURGICAL HISTORY:  Denies any surgeries.

 

MEDICATIONS:  Patient currently on Lasix 40 mg p.o. b.i.d., p.r.n.,

glimepiride 4 mg p.o. b.i.d., ibuprofen 600 mg p.o. b.i.d. p.r.n., lisinopril

40 mg p.o. daily, metformin 850 mg p.o. t.i.d., Januvia 100 mg p.o. daily,

oxygen 2-3 liters, Advair 230/21 mcg inhalation 2 puffs inhalation b.i.d.,

gabapentin 800 mg p.o. b.i.d., p.r.n., Norco 7.5/325 p.o. q. 8 hours p.r.n.

Ellipta 62.5 mcg inhalation daily.

 

FAMILY HISTORY:  Father and mother have diabetes.  Mother has breast cancer.

 

SOCIAL HISTORY:  Smokes half pack a day . Quit for r 5 years, but restarted 2

years ago.  Denies any alcohol use.

 

REVIEW OF SYSTEMS:  As per HPI.  Rest of review of systems negative.

 

PHYSICAL EXAMINATION:

GENERAL:  The patient is morbidly obese and mild respiratory distress.

HEENT:  No pallor, no icterus.  Pupils equal, round, and reactive to light.

NECK:  No JVD, no neck masses, no carotid bruits.

CARDIOVASCULAR:  S1, S2 heard, regular rate and rhythm, no murmur, no gallop.

RESPIRATORY SYSTEM:  Clear to auscultation bilaterally, was tachypneic

initially but is doing okay now.  Mild bibasilar crackles.  No wheezing.

ABDOMEN:  Soft, bowel sounds present, somewhat edematous, erythema seen in

the pannus.

CENTRAL NERVOUS SYSTEM:  Cranial nerves II through XII grossly intact,

nonfocal.

EXTREMITIES:  Bilateral lower extremity gross edema present.  Some

erythematous changes in the lower extremities.

 

LABORATORY DATA:  WBC 11.34, hemoglobin 15, hematocrit 48.8, platelets 274. 

Sodium 137, potassium 5.9, chloride 104, bicarbonate 26, BUN 82, creatinine

1.8, serum glucose 99.  Troponin I less than 0.015.  BNP 7696.  Chest x-ray,

congestive heart failure.

 

EKG: Normal  Sinus rhythm with rate of 77.  Inverted T waves in inferior leads 
and anterolateral

leads.  No old EKG to compare.

 

ASSESSMENT AND PLAN:  This is a 58-year-old female who presents with

shortness of breath.

1.  Acute congestive heart failure with no prior history of diagnosis of

congestive heart failure.  The patient received a dose of Lasix in the ER. 

We will continue IV Lasix b.i.d.  Follow echocardiogram, daily weights, I's

and O's.  Close monitoring, tele floor.  Cardiology consult in a.m. for

further recommendations.  We will continue CPAP tonight.

2.  Obstructive sleep apnea, noncompliant with CPAP.  The patient says she

uses oxygen 2.5 L whenever she gets short of breath at nighttime, but she

is not using  CPAP   We will follow abg in the a.m.

3.  Chronic obstructive pulmonary disease, tobacco abuse for many years. 

Smokes half pack a day.  Continue Advair Diskus and Ellipta.  We will place

on DuoNebs around the clock and p.r.n.  Will place on  short course of

prednisone and also Levaquin.

4.  Possible cellulitis in lower extremities, Levaquin as above.  We will

monitor.

5.  Hyperkalemia.  Hold the lisinopril.  Patient received insulin, dextrose,

calcium chloride and15 grams of Kayexalate.  We will give another dose of 
Kayexalate.  Follow the labs

in a.m.Nephrology consult.

6.  For acute renal failure.  The patient was never told that she has any

renal disease.  Creatinine  1.8 and BUN of 82.  The patient received a

dose of Lasix. possibly cardiorenal.  We will follow the labs in

a.m. and will consult nephrology for further recommendations.

7.  History of diabetes.  Hold metformin, Januvia, and glimepiride.  Placed

on Lantus and insulin sliding scale.  Monitor the blood sugars and follow

HbA1c levels.  Will also hold ibuprofen which patient takes on as needed basis.

8.  Chronic pain.  Continue Percocet p.r.n., but we will hold the ibuprofen.

9. Dysphagia.Will  Consult GI when more stable

10.  Deep venous thrombosis prophylaxis.  Heparin subQ.

 

DISPOSITION:  Admit to tele floor.  Expect discharge home and follow up with

the family doctor and cardiology.  Level 1 full code.

 

 

 

MTDD

## 2018-07-30 NOTE — NEPHROLOGY CONSULTATION
DATE OF CONSULTATION:  07/30/2018

 

REASON FOR CONSULT:

1.  Renal failure with hyperkalemia.

2.  Fluid overload, congestive heart failure.

 

HISTORY OF PRESENT ILLNESS:  The patient is a 58-year-old female with super

morbid obesity, obstructive sleep apnea, and noncompliant with CPAP,

longstanding diabetes, on insulin, hypertension, COPD as well as history of

chronic kidney disease.  At this time, the patient is too much in respiratory

distress to give me a detailed history.  History was mainly constructed from

the chart.  The patient lives in [place].  Her house is getting

repaired and that is why she came to live with her niece in [place] for

the last 1 month.  Before coming to [place], she was in the Emergency

[place] and she was told she has congestive heart failure and

discharged on Lasix tablet as needed and she has not seen any cardiologist or

nephrologist.  She has had progressive weight gain, increasing lower

extremity edema as well as progressive shortness of breath with orthopnea. 

In the Emergency Department yesterday, she was saturating 83% on room air and

even with 6 L of oxygen, she was only saturating at 93%.  She has had

70-pound of weight gain in the last 2 months.  At this time, she is on a

BiPAP, but even then she appears to be short of breath.  Chest x-ray shows

congestive heart failure.  I do not have her baseline kidney function.  At

the time of admission last night, potassium was 5.9, sodium was 137,

creatinine was 1.84, and BUN was 84.  Since then, potassium has even been as

high as 6.0 and the most recent is 5.8.  She has had 40 mg of IV Lasix 2

times, but her urine output has been very minimal with that.  She has a Portillo

catheter.  After the 40 mg of IV Lasix this morning, she has only urinated

about 300 mL.

 

PAST MEDICAL AND SURGICAL HISTORY:  Morbid obesity, obstructive sleep apnea,

noncompliant with CPAP, type 2 diabetes, hypertension, COPD, tobacco abuse,

CKD stage III.

 

PAST SURGICAL HISTORY:  None.

 

MEDICATIONS:  At home was reviewed and is as per the reconciliation list.

 

FAMILY HISTORY:  Both father and mother had diabetes.  Mother has breast

cancer.

 

SOCIAL HISTORY:  Smoker, quit for the last 5 years, restarted 2 years ago. 

Denies alcohol.

 

REVIEW OF SYSTEMS:  Unable to obtain as the patient is on BiPAP and is in

shortness of breath.

 

PHYSICAL EXAMINATION:

GENERAL:  The patient is super morbidly obese.  She is on a BiPAP with mild

respiratory distress.

NECK:  Cannot assess jugular venous distention.

VITAL SIGNS:  Most recent blood pressure is 129/69, 96% on BIPAP with 4 L

oxygen 40% FIO2, respiratory rate 26, pulse rate 78.

CHEST:  Decreased breath sounds.  Unable to hear any breath sounds secondary

to morbid obesity.

CARDIOVASCULAR:  S1 and S2, regular.

ABDOMEN:  Soft, nontender, very obese.

EXTREMITIES:  Bilateral lower extremity edema.

 

LABORATORY TEST:  Most recent labs shows sodium 136, potassium 5.8, chloride

104, BUN 83, creatinine 1.75, glucose 219, calcium 8.3, hemoglobin 14.3, WBC

count 8.92, platelet count 258.  Chest x-ray shows congestive heart failure.

 

ASSESSMENT AND PLAN:  A 58-year-old female who presents with symptomatic

congestive heart failure.  The patient has all the risk factor for severe

fluid overload including possible right-sided heart failure, diastolic

congestive heart failure, chronic kidney disease stage III.  At this time,

there is no question the patient need very aggressive diuresis.  The current

dose of 40 mg IV daily has not really produced significant diuresis. 

 

Chronic kidney disease versus acute renal failure.  At this time, I cannot

tell whether this is her baseline or not as we do not have a baseline kidney

function.  I would guess that she probably has some degree of chronic kidney

disease given her extensive comorbid disease.  Her potassium is also higher

end, most likely related with decreased renal perfusion in the setting of

decompensated heart failure.

 

RECOMMENDATIONS:

1.  She needs aggressive diuresis.  I would write for 100 mg IV Lasix x1 now

with metolazone 5 mg.  Further dose of the Lasix can be titrated based on the

response to this dose, but this is for all practical purpose, the maximum

dose of Lasix.  This should also get her potassium better.  Continue BiPAP.

2.  Check urine for protein-to-creatinine ratio.  Continue daily labs.  At

this time, hold metformin, Januvia, and glimepiride.  Continue to manage with

the insulin.  Consider doing an echocardiogram.

 

Thank you very much.

 

 

 

[facility]

## 2018-07-30 NOTE — PROGRESS NOTE
Medicine Progress Note


Date & Time of Visit:


Jul 30, 2018 at 21:20


.


Subjective





Admitted last night with SOB, CHF, and other problems.





Evaluated several times throughout the day.


Somnolent.


Removed O2 and O2 sats fell to the 60's.





K this morning 6.0.


No associated EKG changes.


Received calcium gluconate, D50/insulin, patiromer, IV furosemide.





ABG @ 0913 on  4 L NC demonstrated pO2 54, pCO2 72, HCO3 26, pH 7.18.


Placed on BiPAP.





Bedside echo performed.


Preliminary reading per Cardiology: grossly normal LV function, dilated RV with 

decreased systolic function.





Repeat ABG @ 1045 on BiPAP with FIO2 60% revealed pO2 106, pCO2 78, HCO3 27, pH 

7.16.


BiPAP adjustments made.





Repeat ABG @ 12:54 on BiPAP with FIO2 40% demonstrated pO2 71, pCO2 77, HCO3 28

, pH 7.18.





Case discussed with Critical Care Medicine.


Patient transferred to ICU.





Re-assessed in late afternoon.


Comfortable on BiPAP.


Good urine output after 2nd dose of IV furosemide.


.





Objective





Last 8 Hrs








  Date Time  Temp Pulse Resp B/P (MAP) Pulse Ox O2 Delivery O2 Flow Rate FiO2


 


7/30/18 20:05  82 24 119/61 (80) 94 Nasal Cannula 5.0 


 


7/30/18 19:48     96 Nasal Cannula 5.0 97





      Mask  


 


7/30/18 19:41  74 18   Mask 4.0 97


 


7/30/18 19:02 37.0 76 18 140/61 (87) 99 Oxymask 4.0 


 


7/30/18 18:02 36.3 78 20 121/58 (79) 96 BiPAP  30


 


7/30/18 17:02  73 19 133/50 (77) 94 BiPAP  30


 


7/30/18 16:02 36.4 70 19 109/56 (73) 97 BiPAP  30


 


7/30/18 16:00      BiPAP  30


 


7/30/18 15:36  70   97   30


 


7/30/18 15:02  75 22 118/51 (73) 99 BiPAP  40


 


7/30/18 15:00  76 24  98 BiPAP  40


 


7/30/18 14:48  74 21  40 BiPAP/CPAP  95


 


7/30/18 14:48  74   95   40








Physical Exam:





General- lying in bed; no acute distress


ENT- BiPAP


Lungs- bibasilar rales, diffuse mild wheezing


Cardiovascular- distant heart sounds; RRR; no murmur or gallop appreciated; 

neck veins difficulty to assess; 1-2+ pretibial edema


Abdomen- obese, + bowel sounds, soft, nontender 


Extremities- no calf tenderness 


Neuro- somnolent


Skin- warm & dry


.


Laboratory Results:





Last 24 Hours








Test


  7/29/18


21:48 7/30/18


00:46 7/30/18


06:15 7/30/18


07:34


 


Venous Blood pH 7.20    


 


Venous Blood Partial Pressure


CO2 69 mmHg 


  


  


  


 


 


Venous Blood Partial Pressure


O2 42 mmHg 


  


  


  


 


 


Venous Blood HCO3 27 mmol/L    


 


Venous Blood Oxygen Saturation 71.1 %    


 


Venous Blood Base Excess -2.8 mEq/L    


 


Bedside Glucose  153 mg/dl   244 mg/dl 


 


White Blood Count   8.92 K/uL  


 


Red Blood Count   4.78 M/uL  


 


Hemoglobin   14.3 g/dL  


 


Hematocrit   47.0 %  


 


Mean Corpuscular Volume   98.3 fL  


 


Mean Corpuscular Hemoglobin   29.9 pg  


 


Mean Corpuscular Hemoglobin


Concent 


  


  30.4 g/dl 


  


 


 


Platelet Count   258 K/uL  


 


Mean Platelet Volume   10.3 fL  


 


Neutrophils (%) (Auto)   94.9 %  


 


Lymphocytes (%) (Auto)   4.0 %  


 


Monocytes (%) (Auto)   0.3 %  


 


Eosinophils (%) (Auto)   0.0 %  


 


Basophils (%) (Auto)   0.1 %  


 


Neutrophils # (Auto)   8.46 K/uL  


 


Lymphocytes # (Auto)   0.36 K/uL  


 


Monocytes # (Auto)   0.03 K/uL  


 


Eosinophils # (Auto)   0.00 K/uL  


 


Basophils # (Auto)   0.01 K/uL  


 


RDW Standard Deviation   59.2 fL  


 


RDW Coefficient of Variation   16.6 %  


 


Immature Granulocyte % (Auto)   0.7 %  


 


Immature Granulocyte # (Auto)   0.06 K/uL  


 


Prothrombin Time   11.6 SECONDS  


 


Prothromb Time International


Ratio 


  


  1.1 


  


 


 


Sodium Level   135 mmol/L  


 


Potassium Level   6.0 mmol/L  


 


Chloride Level   104 mmol/L  


 


Carbon Dioxide Level   24 mmol/L  


 


Anion Gap   7.0 mmol/L  


 


Blood Urea Nitrogen   81 mg/dl  


 


Creatinine   1.69 mg/dl  


 


Est Creatinine Clear Calc


Drug Dose 


  


  60.4 ml/min 


  


 


 


Estimated GFR (


American) 


  


  38.7 


  


 


 


Estimated GFR (Non-


American 


  


  33.4 


  


 


 


BUN/Creatinine Ratio   47.8  


 


Random Glucose   251 mg/dl  


 


Estimated Average Glucose   237 mg/dl  


 


Hemoglobin A1c   9.9 %  


 


Calcium Level   8.4 mg/dl  


 


Magnesium Level   2.1 mg/dl  


 


Troponin I   0.018 ng/ml  


 


Triglycerides Level   67 mg/dl  


 


Cholesterol Level   133 mg/dl  


 


HDL Cholesterol   36 mg/dl  


 


LDL Cholesterol, Calculated   84 mg/dl  


 


VLDL Cholesterol, Calculated   13 mg/dl  


 


Cholesterol/HDL Ratio   3.7  


 


Thyroid Stimulating Hormone


(TSH) 


  


  1.110 uIu/ml 


  


 


 


Random Cortisol   13.67 mcg/dl  


 


Hepatitis C Antibody Screen   NEG  


 


Test


  7/30/18


09:13 7/30/18


10:45 7/30/18


11:22 7/30/18


12:23


 


Arterial Blood pH 7.18  7.16   


 


Arterial Blood Partial


Pressure CO2 72 mmHg 


  78 mmHg 


  


  


 


 


Arterial Blood Partial


Pressure O2 54 mm/Hg 


  106 mm/Hg 


  


  


 


 


Arterial Blood HCO3 26 mmol/L  27 mmol/L   


 


Arterial Blood Oxygen


Saturation 84.0 % 


  97.0 % 


  


  


 


 


Arterial Blood Base Excess -4.1 mEq/L  -3.5 mEq/L   


 


Arterial Blood Gas Delivery 4L  BI-PAP   


 


Chris Test POS  POS   


 


Sodium Level  136 mmol/L   


 


Potassium Level  5.6 mmol/L   5.8 mmol/L 


 


Chloride Level  104 mmol/L   


 


Carbon Dioxide Level  26 mmol/L   


 


Anion Gap  6.0 mmol/L   


 


Blood Urea Nitrogen  83 mg/dl   


 


Creatinine  1.75 mg/dl   


 


Est Creatinine Clear Calc


Drug Dose 


  58.4 ml/min 


  


  


 


 


Estimated GFR (


American) 


  37.1 


  


  


 


 


Estimated GFR (Non-


American 


  32.0 


  


  


 


 


BUN/Creatinine Ratio  47.3   


 


Random Glucose  257 mg/dl   


 


Calcium Level  8.3 mg/dl   


 


Bedside Glucose   219 mg/dl  


 


Test


  7/30/18


12:54 7/30/18


16:31 7/30/18


17:49 7/30/18


18:05


 


Arterial Blood pH 7.18    


 


Arterial Blood Partial


Pressure CO2 77 mmHg 


  


  


  


 


 


Arterial Blood Partial


Pressure O2 71 mm/Hg 


  


  


  


 


 


Arterial Blood HCO3 28 mmol/L    


 


Arterial Blood Oxygen


Saturation 93.0 % 


  


  


  


 


 


Arterial Blood Base Excess -2.4 mEq/L    


 


Arterial Blood Gas Delivery BI-PAP    


 


Chris Test POS    Pass 


 


Bedside Glucose  212 mg/dl   


 


White Blood Count   7.84 K/uL  


 


Red Blood Count   4.73 M/uL  


 


Hemoglobin   14.3 g/dL  


 


Hematocrit   46.4 %  


 


Mean Corpuscular Volume   98.1 fL  


 


Mean Corpuscular Hemoglobin   30.2 pg  


 


Mean Corpuscular Hemoglobin


Concent 


  


  30.8 g/dl 


  


 


 


Platelet Count   241 K/uL  


 


Mean Platelet Volume   10.0 fL  


 


Neutrophils (%) (Auto)   88.2 %  


 


Lymphocytes (%) (Auto)   8.2 %  


 


Monocytes (%) (Auto)   2.7 %  


 


Eosinophils (%) (Auto)   0.1 %  


 


Basophils (%) (Auto)   0.0 %  


 


Neutrophils # (Auto)   6.92 K/uL  


 


Lymphocytes # (Auto)   0.64 K/uL  


 


Monocytes # (Auto)   0.21 K/uL  


 


Eosinophils # (Auto)   0.01 K/uL  


 


Basophils # (Auto)   0.00 K/uL  


 


RDW Standard Deviation   59.3 fL  


 


RDW Coefficient of Variation   16.6 %  


 


Immature Granulocyte % (Auto)   0.8 %  


 


Immature Granulocyte # (Auto)   0.06 K/uL  


 


Prothrombin Time   11.8 SECONDS  


 


Prothromb Time International


Ratio 


  


  1.1 


  


 


 


Activated Partial


Thromboplast Time 


  


  24.9 SECONDS 


  


 


 


Partial Thromboplastin Ratio   1.0  


 


Sodium Level   135 mmol/L  


 


Potassium Level   5.7 mmol/L  


 


Carbon Dioxide Level   27 mmol/L  


 


Anion Gap   5.0 mmol/L  


 


Blood Urea Nitrogen   80 mg/dl  


 


Creatinine   1.52 mg/dl  


 


Est Creatinine Clear Calc


Drug Dose 


  


  67.2 ml/min 


  


 


 


Estimated GFR (


American) 


  


  44.0 


  


 


 


Estimated GFR (Non-


American 


  


  37.9 


  


 


 


BUN/Creatinine Ratio   52.6  


 


Random Glucose   224 mg/dl  


 


Calcium Level   9.0 mg/dl  


 


Troponin I   < 0.015 ng/ml  


 


Blood Gas Sample Site    L Radial 


 


Bedside Blood Gas pH (LAB)    7.28 


 


Bedside Blood Gas pCO2 (LAB)    62 mmHg 


 


Bedside Blood Gas pO2 (LAB)    62 mmHg 


 


Bedside Blood Gas HCO3 (LAB)    29 meq/L 


 


Bedside Blood Gas Total CO2    31 mEq/l 


 


Bedside Blood Gas Base Excess


(LAB) 


  


  


  2.0 meq/L 


 


 


Bedside Blood Gas O2


Saturation 


  


  


  89.0 % 


 


 


Oxygen Delivery Device    BIPAP 


 


Bedside FiO2    30 % 


 


Blood Gas IPAP    18 


 


Test


  7/30/18


19:56 7/30/18


19:57 7/30/18


20:36 


 


 


Procalcitonin 0.17 ng/ml    


 


Lactic Acid Level  0.9 mmol/L   


 


Total Bilirubin  0.6 mg/dl   


 


Direct Bilirubin  0.2 mg/dl   


 


Aspartate Amino Transf


(AST/SGOT) 


  25 U/L 


  


  


 


 


Alanine Aminotransferase


(ALT/SGPT) 


  38 U/L 


  


  


 


 


Alkaline Phosphatase  78 U/L   


 


Ammonia  25.2 umol/L   


 


Total Protein  8.0 gm/dl   


 


Albumin  3.1 gm/dl   


 


Bedside Glucose   233 mg/dl  














 Date/Time


Source Procedure


Growth Status


 


 


 7/30/18 16:30


Nasal MRSA DNA Surveillance Screen - Final


Specimen Negative for MRSA by DNA Probe Complete











Assessment & Plan





ACUTE ON CHRONIC RESPIRATORY FAILURE


Acute hypoxic and hypercapnic respiratory failure.


Details of baseline respiratory status not yet available, but suspect some 

degree of chronicity.


Hypoxia could be secondary to CHF and COPD.


Consider pulmonary embolism.


Will not pursue CT of chest this time due to renal insufficiency.


Venous duplex lower extremities negative for DVT.


Hypercapnia probably secondary to combination of COPD, obesity hypoventilation 

syndrome.


Specific problems addressed below.


BiPAP initiated for ventilatory support.





SLEEP APNEA


Previously diagnosed.


Reportedly noncompliant with CPAP.





COPD


Baseline PFT's not available.


Continue steroids and bronchodilators.





CHF


Presented with weight gain and shortness of breath.


Chest x-ray demonstrated cardiomegaly and pulmonary vascular congestion.


BNP was elevated.


Preliminary echo report: grossly normal LV systolic function; dilated RV with 

decreased systolic function.


Suspect acute on chronic left ventricular diastolic heart failure.


Suspect acute on chronic right ventricular heart failure secondary to 

underlying pulmonary diseases.


Old records from [place] requested for baseline data.


Diurese as necessary.





ABNORMAL EKG


EKG demonstrated nonspecific changes suggesting ischemia.


Cardiology consulted.


Serum troponins negative x 3.


No apparent left ventricular segmental wall motion abnormalities on echo.





HYPERTENSION


Lisinopril stopped due to hyperkalemia.


Follow and titrate therapy.





RENAL INSUFFICIENCY


Serum creatinine at time of admission was 1.84.


Baseline creatinine unknown.


Uses ibuprofen PRN-discontinued.


Creatinine this morning = 1.69.


Nephrology consulted.





HYPERKALEMIA


Serum potassium at time of admission was 5.9.


Patient was taking ACE inhibitor which was discontinued.


Potassium this morning was 6.0.


Received calcium gluconate, D50/insulin, patiromer, IV furosemide.


Repeat potassium at 1045 was 5.6.


Hyperkalemia probably multifactorial- ACE inhibitor, respiratory acidosis, 

renal insufficiency.


Follow.





DM TYPE 2


History of diabetes mellitus type 2, usually managed with metformin, glimepiride

, sitagliptin.


Random glucose in ED 99.


Hemoglobin A1c 9.9.


Hold oral agents during hospital stay.


Lantus/NovoLog per protocol.





INCOMPLETE DATA


Records from [place] requested.





VTE PROPHYLAXIS


Initially received SQ heparin.


Started on IV heparin for possible thromboembolic disease.





DISPOSITION


Critically ill.  


Discharge disposition to be determined.








CRITICAL CARE TIME


Critically ill.


At least 55 minutes time spent on direct patient care.


.


Current Inpatient Medications:





Current Inpatient Medications








 Medications


  (Trade)  Dose


 Ordered  Sig/Vivi


 Route  Start Time


 Stop Time Status Last Admin


Dose Admin


 


 Acetaminophen


  (Tylenol Tab)  650 mg  Q4H  PRN


 PO  7/29/18 23:15


 8/28/18 23:14   


 


 


 Al Hydrox/Mg


 Hydrox/Simethicone


  (Maalox Max Susp)  15 ml  Q4H  PRN


 PO  7/29/18 23:15


 8/28/18 23:14   


 


 


 Ondansetron HCl


  (Zofran Inj)  4 mg  Q6H  PRN


 IV  7/29/18 23:15


 8/28/18 23:14   


 


 


 Nitroglycerin


  (Nitrostat Tab)  0.4 mg  UD  PRN


 SL  7/29/18 23:15


 8/28/18 23:14   


 


 


 Polyethylene


  (Miralax Powder


 Packet)  17 gm  DAILY  PRN


 PO  7/29/18 23:15


 8/28/18 23:14   


 


 


 Gabapentin


  (Neurontin Tab)  800 mg  BID  PRN


 PO  7/29/18 23:15


 8/28/18 23:14 Future Hold  


 


 


 Oxycodone/


 Acetaminophen


  (Percocet


 7.5-325MG Tab)  1 tab  Q8  PRN


 PO  7/29/18 23:15


 8/12/18 23:14   


 


 


 Miscellaneous


 Information


  (Order Awaiting


 Action)  1 ea  QS


 N/A  7/30/18 08:00


 8/29/18 07:59   


 


 


 Miscellaneous


 Information


  (Order Awaiting


 Action)  1 ea  QS


 N/A  7/30/18 08:00


 8/29/18 07:59   


 


 


 Albuterol/


 Ipratropium


  (Duoneb)  3 ml  QIDR


 INH  7/30/18 08:00


 8/29/18 07:59  7/30/18 19:40


3 ML


 


 Prednisone


  (PredniSONE TAB)  40 mg  DAILY


 PO  7/30/18 09:00


 8/29/18 08:59  7/30/18 08:09


40 MG


 


 Levofloxacin 750


 mg/Prmx  150 ml @ 


 100 mls/hr  Q24H


 IV  7/30/18 02:00


 8/6/18 01:59  7/30/18 02:06


100 MLS/HR


 


 Insulin Glargine


  (Lantus Solostar


 Pen)  5 units  BID


 SC  7/30/18 09:00


 8/29/18 08:59  7/30/18 21:12


5 UNITS


 


 Insulin Aspart


  (novoLOG ASPART)  **SLIDING


 SCALE**


 **G...  ACHS


 SC  7/30/18 07:00


 8/29/18 06:59  7/30/18 21:11


9 UNITS


 


 Hydralazine HCl


  (Apresoline Tab)  10 mg  Q6  PRN


 PO  7/29/18 23:15


 8/28/18 23:14  7/30/18 08:08


10 MG


 


 Glucose


  (Glucose 40% Gel)  15-30


 GRAMS 15


 GRAMS...  UD  PRN


 PO  7/29/18 23:45


 8/28/18 23:44   


 


 


 Glucose


  (Glucose Chew


 Tab)  4-8


 Tablets 4


 Tabl...  UD  PRN


 PO  7/29/18 23:45


 8/28/18 23:44   


 


 


 Dextrose


  (Dextrose 50%


 50ML Syringe)  25-50ML


 25ML FOR


 ...  UD  PRN


 IV  7/29/18 23:45


 8/28/18 23:44   


 


 


 Glucagon


  (Glucagon Inj)  1 mg  UD  PRN


 IM  7/29/18 23:45


 8/28/18 23:44   


 


 


 Carbohydrates


  (Carbohydrates


 For Hypoglycemia)  15-30 GRAMS


 15 grams if


 BSG 54-69...  UD  PRN


 PO  7/29/18 23:45


 8/28/18 23:44   


 


 


 Levofloxacin


  (Consult)  1 ea  UD  PRN


 N/A  7/30/18 01:15


 8/29/18 01:14   


 


 


 Nystatin


  (Mycostatin Oint)  1 appln  BID


 EXT  7/30/18 09:00


 8/29/18 08:59  7/30/18 21:04


1 APPLN


 


 Heparin Sodium/


 Dextrose  500 ml @ 


 37 mls/hr  [id number]


 IV  7/30/18 18:45


 8/29/18 18:44  7/30/18 18:58


37 MLS/HR

## 2018-07-30 NOTE — CARDIOLOGY CONSULTATION
Cardiology Consultation


Date of Consultation:  2018


Requesting Physician:  Dr. Reyna


Attending Cardiologist:  Dr. Romero


 (Syeda Mcclure PA-C)





History of Present Illness


Patient is a 58 year old female who is a rather poor historian. It was 

difficult to obtain medical information this morning.  Most history is obtained 

from admission H&P.  Patient resides in [place].  She is currently 

living with her niece in [place].  Over the last 1-2 months patient 

reports worsening fluid retention.  She admits to being evaluated in [place] before traveling to the area, and was told she had "congestive 

heart failure", started on diuretics due to significant fluid retention. She 

reports significant weight gain over the last 2 months.   She denies recent 

cardiac testing such as an echocardiogram, stress test, cardiac 

catheterization.  She denies a cardiac history of MI, CAD. She admits to having 

cardiac "arrhythmia" but is unable to tell me specific names of SVT vs afib.   





History includes morbid obesity, COURTNEY with CPAP noncompliance, diabetes mellitus 

type 2, COPD with underlying tobacco abuse, hypertension, and history of lower 

extremity DVT no longer on anticoagulation. 





She was brought to [place] ER yesterday with concerns regarding worsening SOB, fluid 

retention, weight gain, LE edema, and weakness. Started on IV diuretics. Found 

to have MARGIE with hyperkalemia, although baseline creatinine unknown. Lisinopril 

on hold. Antibiotics for questionable cellulitis. 





Time of consult patient resting in the chair and had difficulty staying awake 

during conversation.  Review of systems limited.  Reports ongoing issues with 

fluid retention 2 months.  Reports ongoing shortness of breath since 

admission.  No chest pain.














 


 (Syeda Mcclure PA-C)





Past Medical/Surgical History


Problem List:


Medical Problems:


(1) Acute CHF


(2) Acute respiratory failure


(3) Asthma


(4) Bronchitis


(5) Diabetes


(6) Emphysema of lung


(7) Hypertension


(8) Pneumonia


(9) Stomach problems


10. History of DVT


 (Syeda Mcclure PA-C)





Family History





Cancer


Diabetes mellitus


Heart disease


Hypertension


No history of premature CAD or sudden cardiac death.


 (ySeda Mcclure PA-C)





Cancer


Diabetes mellitus


Heart disease


Hypertension (Mario Romero,D.O.)


Social History


Smoking Status:  Current Every Day Smoker


Marital Status:  


Occupation:  disabled


 (Syeda Mcclure PA-C)





Review Of Systems


See HPI. Limited ROS.


 (Syeda Mcclure PA-C)





Allergies


Coded Allergies:  


     No Known Allergies (Unverified , 12)





Medications





Reported Home Medications








 Medications  Dose


 Route/Sig


 Max Daily Dose Days Date Category


 


 Oxygen  Gas  2-3 Liters


 NA UD


    18 Reported


 


 Motrin


  (Ibuprofen) 800


 Mg Tab  1,600 Mg


 PO BID PRN


    18 Reported


 


 Neurontin


  (Gabapentin) 800


 Mg Tab  800 Mg


 PO BID PRN


    18 Reported


 


 Incruse Ellipta


  (Umeclidinium


 Bromide) 62.5


 Mcg/Inh Inh  1 Inha


 INH DAILY


    18 Reported


 


 Zestril


  (Lisinopril) 40


 Mg Tab  40 Mg


 PO DAILY


    18 Reported


 


 Januvia


  (Sitagliptin


 Phosphate) 100 Mg


 Tab  100 Mg


 PO DAILY


    18 Reported


 


 Advair Hfa 230/21


 Mcg


  (Fluticasone-Salmeterol


 230/21 Mcg) 1 Aer


 Aer  2 Puffs


 INH BID


    18 Reported


 


 Oxycodone/Acetaminophen


 7.5MG/325MG 1 Tab


 Tab  1 Tab


 PO Q8 PRN


    18 Reported


 


 Glucophage


  (Metformin Hcl)


 850 Mg Tab  850 Mg


 PO TID


    12 Reported


 


 Amaryl


  (Glimepiride) 4


 Mg Tab  4 Mg


 PO BID


    12 Reported


 


 Lasix


  (Furosemide) 20


 Mg Tab  40 Mg


 PO BID PRN


    12 Reported








 (Syeda Mcclure PA-C)





Physical Exam


Vital Signs (Last 8hrs):





Last 8 Hrs








  Date Time  Temp Pulse Resp B/P (MAP) Pulse Ox O2 Delivery O2 Flow Rate FiO2


 


18 07:17 36.7 86 23 96/55 (69) 93 Nasal Cannula 6.0 


 


18 07:07  87 18  93 Nasal Cannula 6.0 


 


18 03:48 36.8 87 22 102/64 (77) 90 Nasal Cannula 7.0 








General Appearance: Drowsy, lethargic. Obese.  


Head: Normocephalic Atraumatic. 


Eyes: PERRLA, EOMI, conjunctiva and sclera clear


Neck:  Supple. No carotid bruits noted. No JVD. No HJD. 


Respiratory:  + bibasilar rales


Cardiovascular: Reg rate and rhythm. Distant heart sounds no audible murmur


Abdomen: Normal bowel sounds, soft nontender. no abdominal bruits. 


Extremities:  Chronic stasis changes, 1-2+ hard indurated edema. 


Neuro:  No focal deficits. 


 (Syeda Mcclure PA-C)





Data





Last 24 Hours








Test


  18


21:02 18


21:08 18


21:48 18


00:46


 


White Blood Count 11.34 K/uL    


 


Red Blood Count 5.07 M/uL    


 


Hemoglobin 15.4 g/dL    


 


Hematocrit 48.8 %    


 


Mean Corpuscular Volume 96.3 fL    


 


Mean Corpuscular Hemoglobin 30.4 pg    


 


Mean Corpuscular Hemoglobin


Concent 31.6 g/dl 


  


  


  


 


 


Platelet Count 274 K/uL    


 


Mean Platelet Volume 9.9 fL    


 


Neutrophils (%) (Auto) 64.3 %    


 


Lymphocytes (%) (Auto) 25.3 %    


 


Monocytes (%) (Auto) 8.4 %    


 


Eosinophils (%) (Auto) 1.3 %    


 


Basophils (%) (Auto) 0.4 %    


 


Neutrophils # (Auto) 7.30 K/uL    


 


Lymphocytes # (Auto) 2.87 K/uL    


 


Monocytes # (Auto) 0.95 K/uL    


 


Eosinophils # (Auto) 0.15 K/uL    


 


Basophils # (Auto) 0.04 K/uL    


 


RDW Standard Deviation 58.3 fL    


 


RDW Coefficient of Variation 16.7 %    


 


Immature Granulocyte % (Auto) 0.3 %    


 


Immature Granulocyte # (Auto) 0.03 K/uL    


 


Sodium Level 137 mmol/L    


 


Potassium Level 5.9 mmol/L    


 


Chloride Level 104 mmol/L    


 


Carbon Dioxide Level 26 mmol/L    


 


Anion Gap 6.0 mmol/L    


 


Blood Urea Nitrogen 82 mg/dl    


 


Creatinine 1.84 mg/dl    


 


Est Creatinine Clear Calc


Drug Dose 56.4 ml/min 


  


  


  


 


 


Estimated GFR (


American) 34.9 


  


  


  


 


 


Estimated GFR (Non-


American 30.1 


  


  


  


 


 


BUN/Creatinine Ratio 44.3    


 


Random Glucose 99 mg/dl    


 


Calcium Level 8.9 mg/dl    


 


Troponin I < 0.015 ng/ml    


 


Pro-B-Type Natriuretic Peptide 7696 pg/ml    


 


Venous Blood pH  7.21  7.20  


 


Venous Blood Partial Pressure


CO2 


  71 mmHg 


  69 mmHg 


  


 


 


Venous Blood Partial Pressure


O2 


  39 mmHg 


  42 mmHg 


  


 


 


Venous Blood HCO3  27 mmol/L  27 mmol/L  


 


Venous Blood Oxygen Saturation  66.6 %  71.1 %  


 


Venous Blood Base Excess  -2.4 mEq/L  -2.8 mEq/L  


 


Bedside Glucose    153 mg/dl 


 


Test


  18


06:15 18


07:34 18


09:13 


 


 


White Blood Count 8.92 K/uL    


 


Red Blood Count 4.78 M/uL    


 


Hemoglobin 14.3 g/dL    


 


Hematocrit 47.0 %    


 


Mean Corpuscular Volume 98.3 fL    


 


Mean Corpuscular Hemoglobin 29.9 pg    


 


Mean Corpuscular Hemoglobin


Concent 30.4 g/dl 


  


  


  


 


 


Platelet Count 258 K/uL    


 


Mean Platelet Volume 10.3 fL    


 


Neutrophils (%) (Auto) 94.9 %    


 


Lymphocytes (%) (Auto) 4.0 %    


 


Monocytes (%) (Auto) 0.3 %    


 


Eosinophils (%) (Auto) 0.0 %    


 


Basophils (%) (Auto) 0.1 %    


 


Neutrophils # (Auto) 8.46 K/uL    


 


Lymphocytes # (Auto) 0.36 K/uL    


 


Monocytes # (Auto) 0.03 K/uL    


 


Eosinophils # (Auto) 0.00 K/uL    


 


Basophils # (Auto) 0.01 K/uL    


 


RDW Standard Deviation 59.2 fL    


 


RDW Coefficient of Variation 16.6 %    


 


Immature Granulocyte % (Auto) 0.7 %    


 


Immature Granulocyte # (Auto) 0.06 K/uL    


 


Prothrombin Time 11.6 SECONDS    


 


Prothromb Time International


Ratio 1.1 


  


  


  


 


 


Sodium Level 135 mmol/L    


 


Potassium Level 6.0 mmol/L    


 


Chloride Level 104 mmol/L    


 


Carbon Dioxide Level 24 mmol/L    


 


Anion Gap 7.0 mmol/L    


 


Blood Urea Nitrogen 81 mg/dl    


 


Creatinine 1.69 mg/dl    


 


Est Creatinine Clear Calc


Drug Dose 60.4 ml/min 


  


  


  


 


 


Estimated GFR (


American) 38.7 


  


  


  


 


 


Estimated GFR (Non-


American 33.4 


  


  


  


 


 


BUN/Creatinine Ratio 47.8    


 


Random Glucose 251 mg/dl    


 


Estimated Average Glucose 237 mg/dl    


 


Hemoglobin A1c 9.9 %    


 


Calcium Level 8.4 mg/dl    


 


Magnesium Level 2.1 mg/dl    


 


Troponin I 0.018 ng/ml    


 


Triglycerides Level 67 mg/dl    


 


Cholesterol Level 133 mg/dl    


 


HDL Cholesterol 36 mg/dl    


 


LDL Cholesterol, Calculated 84 mg/dl    


 


VLDL Cholesterol, Calculated 13 mg/dl    


 


Cholesterol/HDL Ratio 3.7    


 


Thyroid Stimulating Hormone


(TSH) 1.110 uIu/ml 


  


  


  


 


 


Random Cortisol 13.67 mcg/dl    


 


Hepatitis C Antibody Screen NEG    


 


Bedside Glucose  244 mg/dl   


 


Arterial Blood pH   7.18  


 


Arterial Blood Partial


Pressure CO2 


  


  72 mmHg 


  


 


 


Arterial Blood Partial


Pressure O2 


  


  54 mm/Hg 


  


 


 


Arterial Blood HCO3   26 mmol/L  


 


Arterial Blood Oxygen


Saturation 


  


  84.0 % 


  


 


 


Arterial Blood Base Excess   -4.1 mEq/L  


 


Arterial Blood Gas Delivery   4L  


 


Chris Test   POS  








Imaging: 


Chest x-ray on admission demonstrating pulmonary vascular congestion consistent 

with CHF.





EK2018 19:52:06 [facility]


Normal sinus rhythm


Rightward axis


Low voltage QRS


Septal infarct , age undetermined


T wave abnormality, consider inferior ischemia


T wave abnormality, consider anterolateral ischemia


Abnormal ECG


No previous ECGs





Repeat EKG 2018


Normal sinus rhythm


Right axis deviation


Incomplete right bundle branch block


Possible Right ventricular hypertrophy


T wave abnormality, consider inferior ischemia


Abnormal ECG


When compared with ECG of 2018 19:52, (unconfirmed)


Criteria for Septal infarct are no longer Present


T wave inversion no longer evident in Anterolateral leads


QT has lengthened





Telemetry reviewed: Normal sinus rhythm with PVCs.  No concerning arrhythmias.


 (Syeda Mcclure PA-C)





Assessment & Plan


1. Acute respiratory failure with hypoxia secondary to probable acute diastolic 

vs acute right heart failure, pickwickian


2. COURTNEY with history of non compliance with CPAP


3. COPD wiht ongoing tobacco abuse


4. Patient reports history of DVT, no  longer on anticoagulation therapy. 


5. Hypertension


6. MARGIE wiht hyperkalemia.


7. Abnormal EKG, negative cardiac enzymes x2








PLAN: 


Continue IV furosemide today. 


Monitor I+O's


No supplemental potassium. 


Lisinopril on hold. 


Monitor K


CPAP/BIPAP therapy


ABG's pending


echo pending


Venous duplex ordered given history of DVT. ? PE. Unable to complete CT scan 

given MARGIE. VQ scan likely limited due to body habitus.





DVT proph wiht SQ hep for now





Case discussed with Dr. Romero. Will follow














 (Syeda Mcclure PA-C)


CARDIOLOGY ATTENDING ADDENDUM: 


The patient was seen and personally examined.


Agree with Syeda Mcclure PA-C's findings and plans as documented above.





Subjective: For the time the patient was assessed by the undersigned, 

respiratory status has declined to the point that she was somnolent and 

snoring.  Her arterial blood gas revealed significant CO2 retention and hypoxia

, with respiratory acidosis.  And BiPAP was actively being applied.





Data:


Echocardiogram being performed at the bedside was reviewed preliminarily with 

noted right ventricular chamber enlargement and hypokinesis with relative small 

left ventricular chamber size in comparison.  Left ventricular systolic 

function was grossly normal on preliminary evaluation, lateral views were 

completed at that time.  Right chamber enlargement was present including right 

ventricular and right atrial enlargement consistent with perhaps some degree of 

chronicity of her right ventricular dysfunction consistent with suspected 

history of obesity hypoventilation syndrome





Impression / plan: 


Fortunately her renal function has improved with diuresis so far.


She continues to have hyperkalemia and she has received appropriate medication 

for this including dextrose, insulin, calcium gluconate,Veltassa.


Plan for bipap, diuretic therapy, close follow up of electrolytes.


Check LEVduplex to exclude DVT. Not candidate for CTA at present. 


 (Mario Romero,D.O.)

## 2018-07-30 NOTE — CARDIOLOGY PROGRESS NOTE
Cardiology Progress Note


Date of Service


2018.





Cardiology Progress Note


Echocardiogram reveals severe RV chamber enlargement with diffuse RV 

hypokinesis.


The RA is also dilated , suggestive some degree of chronicity.


Severe pulmonary HTN is present based on Doppler.





LEVDuplex was negative for DVT.


RV dysfunction may be chronic and related to obesity hypoventilation syndrome 

however PE is still a concern given degree of hypoxia on bipap.


Troponin negative x 2 thus far, however, EKG reveals ischemic changes.





Plan: will trend troponin. Start UF heparin gtt, standard protocol, no bolus 

given recent SQ heparin dose


Discussed with Dr Brandon by phone.








Echo 18 as below-no prior echo available for comparison:


Name: VICKY SAHNI Study Date: 2018 09:17 AM BP: 96/55 mmHg 


MRN: [id number] [place] [facility] [place]1 HR: 87 


: 1960 (M/d/yyyy) Gender: Female Height: 67 in 


Age: 58 yrs Ethnicity: [place] Weight: 372 lb 


Ordering Physician: Venu Reyna


Referring Physician: Self, Referred 


Performed By: Allyson Keene, [facility]


Accession# [id number] Account# [id number] 


Reason For Study: CHF


BSA: 2.6 m2.





SUMMARY:


The study was technically limited but adequate for the referral indication.


There is moderate concentric left ventricular hypertrophy.


The right ventricle is severely dilated.


Severe diffuse right ventricular hypokinesis is present.


The left ventricular chamber size is small by comparison.


Flattened septum is consistent with RV pressure/volume overload.


The LV wall motion is otherwise normal.


The left ventricular ejection Fraction = >70 %.


There is mild to moderate tricuspid regurgitation.


Severe pulmonary hypertension is present, the calculated pulmonary artery 

systolic pressure is in the range of 70-80 mmHg.


The right atrium is moderately dilated.











Procedure Details


A complete two-dimensional transthoracic echocardiogram was performed (2D, M-

mode, Doppler and color flow Doppler).


The study was technically difficult.








Left Ventricle


The left ventricular cavity is small.


There is moderate concentric left ventricular hypertrophy.


Left ventricular systolic function is normal.


Ejection Fraction = >70 %.


Flattened septum is consistent with RV pressure/volume overload.


The LV wall motion is otherwise normal.











Right Ventricle


The right ventricle is severely dilated.


Severe diffuse right ventricular hypokinesis is present.











Atria


The left atrial size is normal.


The right atrium is moderately dilated.


There is no evidence of atrial septal defect, but resolution does not allow 

assessment for a patent foramen ovale.











Aortic Valve


The aortic valve is trileaflet.


There is no significant aortic regurgitation.


Aortic stenosis is absent.











Pulmonic Valve


The pulmonary valve is not well seen, but the Doppler examination is normal 

without significant regurgitation or stenosis.











Mitral Valve


The mitral valve is normal.


Significant mitral regurgitation is absent.


There is no mitral valve stenosis.











Tricuspid Valve


The tricuspid valve is normal.


There is mild to moderate tricuspid regurgitation.


Severe pulmonary hypertension is present, the calculated pulmonary artery 

systolic pressure is in the range of 70-80 mmHg.


There is no tricuspid stenosis.











Great Vessels


The aortic root and proximal ascending aorta are normal sized.


Inferior vena cava is dilated, however collapses 50% with inspiration 

consistent with an intermediate right atrial pressure of 8 mmHg.











Pericardium/Pleural


There is no pericardial effusion.

## 2018-07-30 NOTE — PROCEDURE NOTE
Procedure Note


Date of Service


Jul 30, 2018.





Procedure Note


Critical Care Medicine


Point of Care Bedside Ultrasound





Procedure:  Limited Bedside Lung Ultrasound


Procedure Date: July 30, 2018


Indication: Acute hypercarbic hypoxic respiratory failure





Attending: CLIFFORD Anguiano DO








Organs Examined:  Lung





BLUE point (upper), BLUE point (lower), Phrenic Point (axillary), PLAPS point (

posterior)





A lines visualized: Present, Hemithorax: Bilaterally


B lines visualized: Absent, Hemithorax: Bilaterally


Lung Sliding: Present, Hemithorax: Bilaterally


Tissue-like Sign: Absent, Hemithorax: Bilaterally


Shred Sign: Absent, Hemithorax: Bilaterally


Quad Sign: Absent, Hemithorax: Bilateral


Sinusoid Sign: Absent, Hemithorax: Bilateral


Type of effusions: None, Hemithorax: Bilateral


Interpleural distance: Not applicable





Impression: Type a profile bilaterally





Images obtained are saved for permanent record
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
## 2018-07-30 NOTE — DIAGNOSTIC IMAGING REPORT
BILATERAL LOWER EXTREMITY VENOUS DOPPLER



HISTORY:      history of DVT; LE edema



COMPARISON STUDY:  None.



FINDINGS: No DVT within the bilateral common femoral, superficial femoral,

popliteal veins. Calf vessels are not well visualized due to the patient's body

habitus. There is broken flow within the calf vessels.



IMPRESSION:  



1. No DVT within the bilateral common femoral through popliteal veins.

2. The calf vessels are not well visualized due to the patient's body habitus.

Broken flow favors artifact. Nonocclusive thrombus could also have a similar

appearance but are considered less likely.







Electronically signed by:  Marquise Barrett M.D.

7/30/2018 4:55 PM



Dictated Date/Time:  7/30/2018 4:51 PM

## 2018-07-31 VITALS — SYSTOLIC BLOOD PRESSURE: 118 MMHG | OXYGEN SATURATION: 95 % | HEART RATE: 71 BPM | DIASTOLIC BLOOD PRESSURE: 62 MMHG

## 2018-07-31 VITALS — DIASTOLIC BLOOD PRESSURE: 56 MMHG | SYSTOLIC BLOOD PRESSURE: 114 MMHG | HEART RATE: 71 BPM | OXYGEN SATURATION: 91 %

## 2018-07-31 VITALS
OXYGEN SATURATION: 96 % | TEMPERATURE: 97.52 F | DIASTOLIC BLOOD PRESSURE: 84 MMHG | SYSTOLIC BLOOD PRESSURE: 158 MMHG | HEART RATE: 76 BPM

## 2018-07-31 VITALS
TEMPERATURE: 97.7 F | DIASTOLIC BLOOD PRESSURE: 67 MMHG | SYSTOLIC BLOOD PRESSURE: 115 MMHG | HEART RATE: 79 BPM | OXYGEN SATURATION: 94 %

## 2018-07-31 VITALS — DIASTOLIC BLOOD PRESSURE: 42 MMHG | OXYGEN SATURATION: 89 % | HEART RATE: 71 BPM | SYSTOLIC BLOOD PRESSURE: 98 MMHG

## 2018-07-31 VITALS
DIASTOLIC BLOOD PRESSURE: 60 MMHG | OXYGEN SATURATION: 92 % | TEMPERATURE: 97.52 F | HEART RATE: 72 BPM | SYSTOLIC BLOOD PRESSURE: 133 MMHG

## 2018-07-31 VITALS — OXYGEN SATURATION: 96 % | SYSTOLIC BLOOD PRESSURE: 98 MMHG | HEART RATE: 75 BPM | DIASTOLIC BLOOD PRESSURE: 76 MMHG

## 2018-07-31 VITALS
OXYGEN SATURATION: 94 % | HEART RATE: 76 BPM | SYSTOLIC BLOOD PRESSURE: 147 MMHG | DIASTOLIC BLOOD PRESSURE: 84 MMHG | TEMPERATURE: 97.88 F

## 2018-07-31 VITALS — OXYGEN SATURATION: 96 % | HEART RATE: 77 BPM

## 2018-07-31 VITALS — OXYGEN SATURATION: 94 % | HEART RATE: 91 BPM

## 2018-07-31 VITALS
OXYGEN SATURATION: 95 % | DIASTOLIC BLOOD PRESSURE: 62 MMHG | HEART RATE: 71 BPM | TEMPERATURE: 97.7 F | SYSTOLIC BLOOD PRESSURE: 118 MMHG

## 2018-07-31 VITALS
DIASTOLIC BLOOD PRESSURE: 68 MMHG | TEMPERATURE: 97.7 F | OXYGEN SATURATION: 94 % | SYSTOLIC BLOOD PRESSURE: 110 MMHG | HEART RATE: 73 BPM

## 2018-07-31 VITALS — HEART RATE: 77 BPM | OXYGEN SATURATION: 90 %

## 2018-07-31 VITALS
DIASTOLIC BLOOD PRESSURE: 84 MMHG | TEMPERATURE: 97.7 F | OXYGEN SATURATION: 90 % | SYSTOLIC BLOOD PRESSURE: 126 MMHG | HEART RATE: 84 BPM

## 2018-07-31 VITALS — SYSTOLIC BLOOD PRESSURE: 123 MMHG | DIASTOLIC BLOOD PRESSURE: 73 MMHG | HEART RATE: 71 BPM | OXYGEN SATURATION: 93 %

## 2018-07-31 VITALS — SYSTOLIC BLOOD PRESSURE: 130 MMHG | HEART RATE: 72 BPM | DIASTOLIC BLOOD PRESSURE: 71 MMHG | OXYGEN SATURATION: 94 %

## 2018-07-31 VITALS — OXYGEN SATURATION: 96 % | HEART RATE: 109 BPM

## 2018-07-31 VITALS — DIASTOLIC BLOOD PRESSURE: 62 MMHG | SYSTOLIC BLOOD PRESSURE: 123 MMHG | OXYGEN SATURATION: 93 % | HEART RATE: 73 BPM

## 2018-07-31 VITALS — OXYGEN SATURATION: 92 % | DIASTOLIC BLOOD PRESSURE: 47 MMHG | SYSTOLIC BLOOD PRESSURE: 104 MMHG | HEART RATE: 75 BPM

## 2018-07-31 VITALS
HEART RATE: 70 BPM | DIASTOLIC BLOOD PRESSURE: 48 MMHG | TEMPERATURE: 98.6 F | OXYGEN SATURATION: 91 % | SYSTOLIC BLOOD PRESSURE: 90 MMHG

## 2018-07-31 VITALS — HEART RATE: 86 BPM | DIASTOLIC BLOOD PRESSURE: 84 MMHG | SYSTOLIC BLOOD PRESSURE: 101 MMHG | OXYGEN SATURATION: 93 %

## 2018-07-31 VITALS — HEART RATE: 68 BPM | OXYGEN SATURATION: 96 %

## 2018-07-31 VITALS — HEART RATE: 77 BPM

## 2018-07-31 LAB
BASOPHILS # BLD: 0.01 K/UL (ref 0–0.2)
BASOPHILS NFR BLD: 0.1 %
BUN SERPL-MCNC: 80 MG/DL (ref 7–18)
CALCIUM SERPL-MCNC: 8.7 MG/DL (ref 8.5–10.1)
CO2 SERPL-SCNC: 28 MMOL/L (ref 21–32)
CREAT SERPL-MCNC: 1.54 MG/DL (ref 0.6–1.2)
EOS ABS #: 0.01 K/UL (ref 0–0.5)
EOSINOPHIL NFR BLD AUTO: 239 K/UL (ref 130–400)
GLUCOSE SERPL-MCNC: 165 MG/DL (ref 70–99)
HCT VFR BLD CALC: 45.1 % (ref 37–47)
HGB BLD-MCNC: 13.9 G/DL (ref 12–16)
IG#: 0.05 K/UL (ref 0–0.02)
IMM GRANULOCYTES NFR BLD AUTO: 12.5 %
LYMPHOCYTES # BLD: 1.33 K/UL (ref 1.2–3.4)
MCH RBC QN AUTO: 30 PG (ref 25–34)
MCHC RBC AUTO-ENTMCNC: 30.8 G/DL (ref 32–36)
MCV RBC AUTO: 97.4 FL (ref 80–100)
MONO ABS #: 0.79 K/UL (ref 0.11–0.59)
MONOCYTES NFR BLD: 7.4 %
NEUT ABS #: 8.44 K/UL (ref 1.4–6.5)
NEUTROPHILS # BLD AUTO: 0.1 %
NEUTROPHILS NFR BLD AUTO: 79.4 %
PMV BLD AUTO: 9.8 FL (ref 7.4–10.4)
POTASSIUM SERPL-SCNC: 5.3 MMOL/L (ref 3.5–5.1)
PTT PATIENT: 67.5 SECONDS (ref 21–31)
PTT PATIENT: 70 SECONDS (ref 21–31)
PTT PATIENT: 85.7 SECONDS (ref 21–31)
RED CELL DISTRIBUTION WIDTH CV: 16.7 % (ref 11.5–14.5)
RED CELL DISTRIBUTION WIDTH SD: 58.3 FL (ref 36.4–46.3)
SODIUM SERPL-SCNC: 136 MMOL/L (ref 136–145)
WBC # BLD AUTO: 10.63 K/UL (ref 4.8–10.8)

## 2018-07-31 RX ADMIN — ALUMINUM ZIRCONIUM TRICHLOROHYDREX GLY SCH EA: 0.2 STICK TOPICAL at 14:30

## 2018-07-31 RX ADMIN — OXYCODONE AND ACETAMINOPHEN PRN TAB: 7.5; 325 TABLET ORAL at 09:49

## 2018-07-31 RX ADMIN — IPRATROPIUM BROMIDE AND ALBUTEROL SULFATE SCH ML: .5; 3 SOLUTION RESPIRATORY (INHALATION) at 07:44

## 2018-07-31 RX ADMIN — IPRATROPIUM BROMIDE AND ALBUTEROL SULFATE SCH ML: .5; 3 SOLUTION RESPIRATORY (INHALATION) at 19:15

## 2018-07-31 RX ADMIN — OXYCODONE AND ACETAMINOPHEN PRN TAB: 7.5; 325 TABLET ORAL at 23:01

## 2018-07-31 RX ADMIN — IPRATROPIUM BROMIDE AND ALBUTEROL SULFATE SCH ML: .5; 3 SOLUTION RESPIRATORY (INHALATION) at 15:28

## 2018-07-31 RX ADMIN — NYSTATIN SCH APPLN: 100000 OINTMENT TOPICAL at 20:53

## 2018-07-31 RX ADMIN — IPRATROPIUM BROMIDE AND ALBUTEROL SULFATE SCH ML: .5; 3 SOLUTION RESPIRATORY (INHALATION) at 11:20

## 2018-07-31 RX ADMIN — ALUMINUM ZIRCONIUM TRICHLOROHYDREX GLY SCH EA: 0.2 STICK TOPICAL at 07:48

## 2018-07-31 RX ADMIN — INSULIN ASPART SCH UNITS: 100 INJECTION, SOLUTION INTRAVENOUS; SUBCUTANEOUS at 16:52

## 2018-07-31 RX ADMIN — METOLAZONE SCH MG: 5 TABLET ORAL at 16:26

## 2018-07-31 RX ADMIN — LEVOFLOXACIN SCH MLS/HR: 5 INJECTION, SOLUTION INTRAVENOUS at 02:19

## 2018-07-31 RX ADMIN — NYSTATIN SCH APPLN: 100000 OINTMENT TOPICAL at 07:47

## 2018-07-31 RX ADMIN — ALUMINUM ZIRCONIUM TRICHLOROHYDREX GLY SCH EA: 0.2 STICK TOPICAL at 21:20

## 2018-07-31 RX ADMIN — HEPARIN SODIUM SCH MLS/HR: 5000 INJECTION, SOLUTION INTRAVENOUS at 07:53

## 2018-07-31 RX ADMIN — INSULIN GLARGINE SCH UNITS: 100 INJECTION, SOLUTION SUBCUTANEOUS at 07:51

## 2018-07-31 RX ADMIN — INSULIN ASPART SCH UNITS: 100 INJECTION, SOLUTION INTRAVENOUS; SUBCUTANEOUS at 12:11

## 2018-07-31 RX ADMIN — INSULIN ASPART SCH UNITS: 100 INJECTION, SOLUTION INTRAVENOUS; SUBCUTANEOUS at 20:56

## 2018-07-31 RX ADMIN — INSULIN ASPART SCH UNITS: 100 INJECTION, SOLUTION INTRAVENOUS; SUBCUTANEOUS at 07:50

## 2018-07-31 NOTE — CLINICAL DOCUMENTATION QUERY
Dr. LAZCANO RENA :



**** CLINICAL DOCUMENTATION QUERY****



Patient is a 58 year old morbidly obese female admitted for acute on chronic LV diastolic 
heart failure and suspected acute on chronic RV heart failure secondary to pulmonary 
disease. Patient is a known smoker.  On admission, placed on Duonebs ATC and PRN, 
prednisone, and Levaquin.  "diffuse mild wheezing" noted on physical exam.  As 
appropriate, consider capture of this clinical scenario as suggested below.  Thank you. 



In your clinical opinion is this patient being managed for:

    

                        (x  ) COPD exacerbation - possible

                        (  ) Not Agree



                        (  ) Other explanation of clinical findings (No explanation is 
considered a No Response)

                        (  ) Unable to determine 

                        (  ) Need to Discuss (Phone CDS or qliq) (No discussion is 
considered a No Response)

                                             

The medical record reflects the following clinical findings, treatment, and risk factors.  
  



Clinical Indicators: As above

Treatment: PPV, steroids, antibiotics, nebulizers, supplemental O2

Risk Factors: Smoking, morbid obesity



Please clarify and document your clinical opinion in the progress notes and discharge 
summary. Terms such as "probable", "suspected", "likely", "questionable", "possible", or 
"still to be ruled out" are acceptable. 



*****IF IN AGREEMENT, YOU MUST DOCUMENT ABOVE DIAGNOSTIC STATEMENT IN DAILY PROGRESS NOTES 
AND DISCHARGE SUMMARY. This document is not part of the patient's record.*****

Thank You, Singh Berry, ROSS [phone]

## 2018-07-31 NOTE — ECHOCARDIOGRAM REPORT
*NOTICE TO RECEIVING PARTY AGENCY**  This information is strictly Confidential and protected under 
[place] law.  [place] law prohibits you from making any further disclosure of this 
information unless further disclosure is expressly permitted by the written consent of the person to 
whom it pertains or is authorized by law.  A general authorization for the release of medical or 
other information is not sufficient for this purpose.  Hospital accepts no responsibility if the 
information is made available to any other person, INCLUDING THE PATIENT.



Interpretation Summary

  *  Name: VICKY SAHNI  Study Date: 2018 09:17 AM  BP: 96/55 mmHg

  *  MRN: [id number]  Patient Location: C.2E\S\E208\S\1  HR: 87

  *  : 1960 (M/d/yyyy)  Gender: Female  Height: 67 in

  *  Age: 58 yrs  Ethnicity: [place]  Weight: 372 lb

  *  Ordering Physician: Venu Reyna

  *  Referring Physician: Self, Referred

  *  Performed By: Allyson Keene RCS

  *  Accession# [id number]  Account# [id number]

  *  Reason For Study: CHF

  *  BSA: 2.6 m2

  *  -- Conclusions --

  *  The study was technically limited but adequate for the referral indication.

  *  There is moderate concentric left ventricular hypertrophy.

  *  The right ventricle is severely dilated.

  *  Severe diffuse right ventricular hypokinesis is present.

  *  The left ventricular chamber size is small by comparison.

  *  Flattened septum is consistent with RV pressure/volume overload.

  *  The LV wall motion is otherwise normal.

  *  The left ventricular ejection Fraction = >70 %.

  *  There is mild to moderate tricuspid regurgitation.

  *  Severe pulmonary hypertension is present, the calculated pulmonary artery systolic pressure is 
in the range of 70-80 mmHg.

  *  The right atrium is moderately dilated.

Procedure Details

  *  A complete two-dimensional transthoracic echocardiogram was performed (2D, M-mode, Doppler and 
color flow Doppler).

  *  The study was technically difficult.

Left Ventricle

  *  The left ventricular cavity is small.

  *  There is moderate concentric left ventricular hypertrophy.

  *  Left ventricular systolic function is normal.

  *  Ejection Fraction = >70 %.

  *  Flattened septum is consistent with RV pressure/volume overload.

  *  The LV wall motion is otherwise normal.

Right Ventricle

  *  The right ventricle is severely dilated.

  *  Severe diffuse right ventricular hypokinesis is present.

Atria

  *  The left atrial size is normal.

  *  The right atrium is moderately dilated.

  *  There is no evidence of atrial septal defect, but resolution does not allow assessment for a 
patent foramen ovale.

Mitral Valve

  *  The mitral valve is normal.

  *  There is no mitral valve stenosis.

  *  Significant mitral regurgitation is absent.

Tricuspid Valve

  *  The tricuspid valve is normal.

  *  There is no tricuspid stenosis.

  *  There is mild to moderate tricuspid regurgitation.

  *  Severe pulmonary hypertension is present, the calculated pulmonary artery systolic pressure is 
in the range of 70-80 mmHg.

Aortic Valve

  *  The aortic valve is trileaflet.

  *  Aortic stenosis is absent.

  *  There is no significant aortic regurgitation.

Pulmonic Valve

  *  The pulmonary valve is not well seen, but the Doppler examination is normal without significant 
regurgitation or stenosis.

Great Vessels

  *  The aortic root and proximal ascending aorta are normal sized.

Pericardium/Pleural

  *  There is no pericardial effusion.

Great Vessels

  *  Inferior vena cava is dilated, however collapses 50% with inspiration consistent with an 
intermediate right atrial pressure of 8 mmHg.



MMode 2D Measurements and Calculations

IVSd 1.6 cm

IVSs 1.9 cm



LVIDd 4.1 cm

LVIDs 2.8 cm

LVPWd 1.5 cm

LVPWs 1.8 cm



IVS/LVPW 1.1 

FS 32.5 %

EDV(Teich) 74.4 ml

ESV(Teich) 28.8 ml

EF(Teich) 61.2 %



EDV(cubed) 69.1 ml

ESV(cubed) 21.3 ml

EF(cubed) 69.2 %

% IVS thick 15.1 %

% LVPW thick 20.3 %





LV mass(C)d 253.9 grams

LV mass(C)dI 96.5 grams/m\S\2

LV mass(C)s 202.6 grams

LV mass(C)sI 77.0 grams/m\S\2



SV(Teich) 45.5 ml

SI(Teich) 17.3 ml/m\S\2

SV(cubed) 47.8 ml

SI(cubed) 18.2 ml/m\S\2



Ao root diam 3.1 cm

Ao root area 7.3 cm\S\2

LA dimension 4.1 cm



LA/Ao 1.3 

LVOT diam 2.0 cm

LVOT area 3.1 cm\S\2







Doppler Measurements and Calculations

LV V1 max PG 6.4 mmHg



LV V1 max 125.7 cm/sec



TR max gerardo 426.4 cm/sec

## 2018-07-31 NOTE — PROGRESS NOTE
DATE: 07/31/2018

 

NEPHROLOGY NOTE

 

SUBJECTIVE:  Overnight, patient did good.  She had 4.25 liters of urine

yesterday and with that she feels better breathing wise.  She still has lot

of edema.  She is more interactive and actually able to talk today.

 

OBJECTIVE:

VITAL SIGNS:  Blood pressure 118/62, 95% on 2-liter nasal cannula, pulse rate

71 per minute, temperature 36.5.

HEENT:  Mucous membrane is moist.

NECK:  Supple and short and obese.  Cannot assess JVD.

CHEST:  Decreased breath sound, basal crackles.

CARDIOVASCULAR:  Regular rate and rhythm.  Cannot really hear heart sound

well.  No murmur heard.

ABDOMEN:  Soft, nontender, morbidly obese.  Abdominal wall edema.

EXTREMITIES:  3+ edema with chronic venous stasis changes as well as skin

edema extending all the way to the hip and abdominal wall.

 

LABORATORY TESTS:  Hemoglobin 13.9, WBC count 10.63, sodium 136, potassium

5.3, BUN 80, creatinine 1.54, BNP 7696.

 

ASSESSMENT AND PLAN:  A 58-year-old female with symptomatic congestive heart

failure, both right-sided as well as left-sided together with diastolic

congestive heart failure.  She also has severe pulmonary hypertension related

most likely with obstructive sleep apnea.  Almost certainly she has some

underlying chronic kidney disease related with multiple medical problems.



1.  Acute versus chronic renal failure.  We do not have any baseline to

compare with, but most certainly she has some underlying chronic kidney

disease.  Creatinine today is slightly better than yesterday.  BUN is high as

expected given her underlying problem.  She still has significant fluid

overload/congestive heart failure and need to be diuresed.  She has gained

about 70 pounds in the last 2 months, so we have a long way to go before we

get her to euvolemic status.  I will continue with the metolazone 5 mg daily

and Lasix 80 mg iv twice daily.  She may need higher dose of Lasix than this. 

We should aim for urine output of at least 3.5 liters to 4 liters every day

to make any headway with her weight loss.

 

 

 

 

MTDD

## 2018-07-31 NOTE — PROGRESS NOTE
Medicine Progress Note


Date & Time of Visit:


Jul 31, 2018 at 10:00


.


Subjective





CC: 


Follow-up visit for respiratory failure and other problems.





HPI: 


Much better today.


More alert.


Wearing NC this morning.


Ate breakfast.


No fever.


Nonproductive cough.


No dyspnea at rest.





ROS:


General- no fever, no chills


Resp- as noted above in HPI


Cardiac-  no chest pain, no edema


GI- no nausea, no vomiting, no diarrhea


- Portillo cath


.





Objective





Last 8 Hrs








  Date Time  Temp Pulse Resp B/P (MAP) Pulse Ox O2 Delivery O2 Flow Rate FiO2


 


7/31/18 09:02  71 13 123/73 (90) 93 Nasal Cannula 5.0 


 


7/31/18 08:01 36.4 76 20 158/84 (108) 96 Nasal Cannula 5.0 


 


7/31/18 08:00      Nasal Cannula 5.0 


 


7/31/18 07:45  77 18   Mask 5.0 93


 


7/31/18 07:03  72 17 130/71 (90) 94 Nasal Cannula 5.0 


 


7/31/18 06:02  86 18 101/84 (90) 93 BiPAP  30


 


7/31/18 05:02 36.4 72 22 133/60 (84) 92 BiPAP  30


 


7/31/18 04:01  73 18 123/62 (82) 93 BiPAP  30


 


7/31/18 03:01  71 20 114/56 (75) 91 BiPAP  30








Physical Exam:





General- lying in bed; no acute distress


Lungs- bibasilar rales, diffuse mild wheezing


Cardiovascular- distant heart sounds; RRR; no murmur or gallop appreciated; 

neck veins difficulty to assess; 1-2+ pretibial edema


Abdomen- obese, + bowel sounds, soft, nontender 


Extremities- no calf tenderness 


Neuro- alert, oriented


Skin- warm & dry


.


Laboratory Results:





Last 24 Hours








Test


  7/30/18


10:45 7/30/18


11:22 7/30/18


12:23 7/30/18


12:54


 


Arterial Blood pH 7.16    7.18 


 


Arterial Blood Partial


Pressure CO2 78 mmHg 


  


  


  77 mmHg 


 


 


Arterial Blood Partial


Pressure O2 106 mm/Hg 


  


  


  71 mm/Hg 


 


 


Arterial Blood HCO3 27 mmol/L    28 mmol/L 


 


Arterial Blood Oxygen


Saturation 97.0 % 


  


  


  93.0 % 


 


 


Arterial Blood Base Excess -3.5 mEq/L    -2.4 mEq/L 


 


Arterial Blood Gas Delivery BI-PAP    BI-PAP 


 


Chris Test POS    POS 


 


Sodium Level 136 mmol/L    


 


Potassium Level 5.6 mmol/L   5.8 mmol/L  


 


Chloride Level 104 mmol/L    


 


Carbon Dioxide Level 26 mmol/L    


 


Anion Gap 6.0 mmol/L    


 


Blood Urea Nitrogen 83 mg/dl    


 


Creatinine 1.75 mg/dl    


 


Est Creatinine Clear Calc


Drug Dose 58.4 ml/min 


  


  


  


 


 


Estimated GFR (


American) 37.1 


  


  


  


 


 


Estimated GFR (Non-


American 32.0 


  


  


  


 


 


BUN/Creatinine Ratio 47.3    


 


Random Glucose 257 mg/dl    


 


Calcium Level 8.3 mg/dl    


 


Bedside Glucose  219 mg/dl   


 


Test


  7/30/18


16:31 7/30/18


17:49 7/30/18


18:05 7/30/18


19:56


 


Bedside Glucose 212 mg/dl    


 


White Blood Count  7.84 K/uL   


 


Red Blood Count  4.73 M/uL   


 


Hemoglobin  14.3 g/dL   


 


Hematocrit  46.4 %   


 


Mean Corpuscular Volume  98.1 fL   


 


Mean Corpuscular Hemoglobin  30.2 pg   


 


Mean Corpuscular Hemoglobin


Concent 


  30.8 g/dl 


  


  


 


 


Platelet Count  241 K/uL   


 


Mean Platelet Volume  10.0 fL   


 


Neutrophils (%) (Auto)  88.2 %   


 


Lymphocytes (%) (Auto)  8.2 %   


 


Monocytes (%) (Auto)  2.7 %   


 


Eosinophils (%) (Auto)  0.1 %   


 


Basophils (%) (Auto)  0.0 %   


 


Neutrophils # (Auto)  6.92 K/uL   


 


Lymphocytes # (Auto)  0.64 K/uL   


 


Monocytes # (Auto)  0.21 K/uL   


 


Eosinophils # (Auto)  0.01 K/uL   


 


Basophils # (Auto)  0.00 K/uL   


 


RDW Standard Deviation  59.3 fL   


 


RDW Coefficient of Variation  16.6 %   


 


Immature Granulocyte % (Auto)  0.8 %   


 


Immature Granulocyte # (Auto)  0.06 K/uL   


 


Prothrombin Time  11.8 SECONDS   


 


Prothromb Time International


Ratio 


  1.1 


  


  


 


 


Activated Partial


Thromboplast Time 


  24.9 SECONDS 


  


  


 


 


Partial Thromboplastin Ratio  1.0   


 


Sodium Level  135 mmol/L   


 


Potassium Level  5.7 mmol/L   


 


Carbon Dioxide Level  27 mmol/L   


 


Anion Gap  5.0 mmol/L   


 


Blood Urea Nitrogen  80 mg/dl   


 


Creatinine  1.52 mg/dl   


 


Est Creatinine Clear Calc


Drug Dose 


  67.2 ml/min 


  


  


 


 


Estimated GFR (


American) 


  44.0 


  


  


 


 


Estimated GFR (Non-


American 


  37.9 


  


  


 


 


BUN/Creatinine Ratio  52.6   


 


Random Glucose  224 mg/dl   


 


Calcium Level  9.0 mg/dl   


 


Troponin I  < 0.015 ng/ml   


 


Blood Gas Sample Site   L Radial  


 


Bedside Blood Gas pH (LAB)   7.28  


 


Bedside Blood Gas pCO2 (LAB)   62 mmHg  


 


Bedside Blood Gas pO2 (LAB)   62 mmHg  


 


Bedside Blood Gas HCO3 (LAB)   29 meq/L  


 


Bedside Blood Gas Total CO2   31 mEq/l  


 


Bedside Blood Gas Base Excess


(LAB) 


  


  2.0 meq/L 


  


 


 


Bedside Blood Gas O2


Saturation 


  


  89.0 % 


  


 


 


Chris Test   Pass  


 


Oxygen Delivery Device   BIPAP  


 


Bedside FiO2   30 %  


 


Blood Gas IPAP   18  


 


Procalcitonin    0.17 ng/ml 


 


Test


  7/30/18


19:57 7/30/18


20:36 7/31/18


00:43 7/31/18


03:30


 


Lactic Acid Level 0.9 mmol/L    


 


Total Bilirubin 0.6 mg/dl    


 


Direct Bilirubin 0.2 mg/dl    


 


Aspartate Amino Transf


(AST/SGOT) 25 U/L 


  


  


  


 


 


Alanine Aminotransferase


(ALT/SGPT) 38 U/L 


  


  


  


 


 


Alkaline Phosphatase 78 U/L    


 


Ammonia 25.2 umol/L    


 


Total Protein 8.0 gm/dl    


 


Albumin 3.1 gm/dl    


 


Bedside Glucose  233 mg/dl   


 


Activated Partial


Thromboplast Time 


  


  85.7 SECONDS 


  


 


 


Partial Thromboplastin Ratio   3.3  


 


Urine Color    YELLOW 


 


Urine Appearance    CLOUDY 


 


Urine pH    5.0 


 


Urine Specific Gravity    1.018 


 


Urine Protein    NEG 


 


Urine Glucose (UA)    NEG 


 


Urine Ketones    NEG 


 


Urine Occult Blood    3+ 


 


Urine Nitrite    NEG 


 


Urine Bilirubin    NEG 


 


Urine Urobilinogen    NEG 


 


Urine Leukocyte Esterase    TRACE 


 


Urine WBC (Auto)    1-5 /hpf 


 


Urine RBC (Auto)    >30 /hpf 


 


Urine Hyaline Casts (Auto)    >30 /lpf 


 


Urine Epithelial Cells (Auto)    5-10 /lpf 


 


Urine Bacteria (Auto)    NEG 


 


Urine Pathogenic Casts     /lpf 


 


Test


  7/31/18


04:03 7/31/18


07:44 7/31/18


07:51 


 


 


White Blood Count 10.63 K/uL    


 


Red Blood Count 4.63 M/uL    


 


Hemoglobin 13.9 g/dL    


 


Hematocrit 45.1 %    


 


Mean Corpuscular Volume 97.4 fL    


 


Mean Corpuscular Hemoglobin 30.0 pg    


 


Mean Corpuscular Hemoglobin


Concent 30.8 g/dl 


  


  


  


 


 


Platelet Count 239 K/uL    


 


Mean Platelet Volume 9.8 fL    


 


Neutrophils (%) (Auto) 79.4 %    


 


Lymphocytes (%) (Auto) 12.5 %    


 


Monocytes (%) (Auto) 7.4 %    


 


Eosinophils (%) (Auto) 0.1 %    


 


Basophils (%) (Auto) 0.1 %    


 


Neutrophils # (Auto) 8.44 K/uL    


 


Lymphocytes # (Auto) 1.33 K/uL    


 


Monocytes # (Auto) 0.79 K/uL    


 


Eosinophils # (Auto) 0.01 K/uL    


 


Basophils # (Auto) 0.01 K/uL    


 


RDW Standard Deviation 58.3 fL    


 


RDW Coefficient of Variation 16.7 %    


 


Immature Granulocyte % (Auto) 0.5 %    


 


Immature Granulocyte # (Auto) 0.05 K/uL    


 


Activated Partial


Thromboplast Time 70.0 SECONDS 


  


  67.5 SECONDS 


  


 


 


Partial Thromboplastin Ratio 2.7   2.6  


 


Sodium Level 136 mmol/L    


 


Potassium Level 5.3 mmol/L    


 


Chloride Level 102 mmol/L    


 


Carbon Dioxide Level 28 mmol/L    


 


Anion Gap 6.0 mmol/L    


 


Blood Urea Nitrogen 80 mg/dl    


 


Creatinine 1.54 mg/dl    


 


Est Creatinine Clear Calc


Drug Dose 66.3 ml/min 


  


  


  


 


 


Estimated GFR (


American) 43.3 


  


  


  


 


 


Estimated GFR (Non-


American 37.3 


  


  


  


 


 


BUN/Creatinine Ratio 52.2    


 


Random Glucose 165 mg/dl    


 


Calcium Level 8.7 mg/dl    


 


Magnesium Level 2.0 mg/dl    


 


Troponin I 0.016 ng/ml    


 


Bedside Glucose  125 mg/dl   














 Date/Time


Source Procedure


Growth Status


 


 


 7/30/18 16:30


Nasal MRSA DNA Surveillance Screen - Final


Specimen Negative for MRSA by DNA Probe Complete











Assessment & Plan





ACUTE ON CHRONIC RESPIRATORY FAILURE


Acute hypoxic and hypercapnic respiratory failure.


Details of baseline respiratory status not yet available, but suspect some 

degree of chronicity.


Hypoxia could be secondary to CHF and COPD.


Consider pulmonary embolism.


Will not pursue CT of chest this time due to renal insufficiency.


Venous duplex lower extremities negative for DVT.


Hypercapnia probably secondary to combination of COPD, obesity hypoventilation 

syndrome.


Specific problems addressed below.


BiPAP initiated for ventilatory support.


Respiratory status improved.





SLEEP APNEA


Previously diagnosed.


Continue BiPAP when sleeping.


Encourage use of CPAP at home.





ACUTE COPD EXACERBATION


Baseline PFT's not available.


Continue steroids, bronchodilators, levofloxacin.





PULMONARY HYPERTENSION


Estimated PA pressure in 70's per echo.


RV dilated with decreased systolic function.


Pulmonary hypertension could be secondary to COPD, sleep apnea, obesity 

hypoventilation syndrome, or possible chronic thromboembolic disease.


Patient does not wish to consider right-sided cath at this time.


Optimize pulmonary issues as noted above.


Empiric coagulation for possible chronic thromboembolic disease.





CHF


Presented with weight gain and shortness of breath.


Chest x-ray demonstrated cardiomegaly and pulmonary vascular congestion.


BNP was elevated.


Preliminary echo report: grossly normal LV systolic function; dilated RV with 

decreased systolic function.


Suspect acute on chronic left ventricular diastolic heart failure.


Suspect acute on chronic right ventricular heart failure secondary to 

underlying pulmonary diseases.


Old records from [place] requested for baseline data.


Diurese as necessary.





ABNORMAL EKG


EKG demonstrated nonspecific changes suggesting ischemia.


Cardiology consulted.


Serum troponins negative x 3.


No apparent left ventricular segmental wall motion abnormalities on echo.





HYPERTENSION


Lisinopril stopped due to hyperkalemia.


Follow and titrate therapy.





RENAL INSUFFICIENCY


Serum creatinine at time of admission was 1.84.


Baseline creatinine unknown.


Nephrology consulted.


Uses ibuprofen PRN-discontinued.


Creatinine this morning = 1.54.





HYPERKALEMIA


Serum potassium 5.9 at time of admission and jimmy as high as 6.0.


Hyperkalemia probably multifactorial- ACE inhibitor, respiratory acidosis, 

renal insufficiency.


ACE inhibitor discontinued.


K this morning = 5.3.


Follow.





DM TYPE 2


History of diabetes mellitus type 2, usually managed with metformin, glimepiride

, sitagliptin.


Random glucose in ED 99.


Hemoglobin A1c 9.9.


Hold oral agents during hospital stay.


Lantus/NovoLog per protocol.


FBS this morning = 125.





MORBID OBESITY


Wt 162 kg, BMI 55.9.


AHA, diabetic diet.





INCOMPLETE DATA


Records from [place] requested.





VTE PROPHYLAXIS / HISTORY OF DVT


Initially received SQ heparin.


Started on IV heparin for possible thromboembolic disease.


At long-term risk for recurrent DVT.


Transition to oral anticoagulation with warfarin.





DISPOSITION


Discharge disposition to be determined.


.


Current Inpatient Medications:





Current Inpatient Medications








 Medications


  (Trade)  Dose


 Ordered  Sig/Vivi


 Route  Start Time


 Stop Time Status Last Admin


Dose Admin


 


 Acetaminophen


  (Tylenol Tab)  650 mg  Q4H  PRN


 PO  7/29/18 23:15


 8/28/18 23:14   


 


 


 Al Hydrox/Mg


 Hydrox/Simethicone


  (Maalox Max Susp)  15 ml  Q4H  PRN


 PO  7/29/18 23:15


 8/28/18 23:14   


 


 


 Ondansetron HCl


  (Zofran Inj)  4 mg  Q6H  PRN


 IV  7/29/18 23:15


 8/28/18 23:14   


 


 


 Nitroglycerin


  (Nitrostat Tab)  0.4 mg  UD  PRN


 SL  7/29/18 23:15


 8/28/18 23:14   


 


 


 Polyethylene


  (Miralax Powder


 Packet)  17 gm  DAILY  PRN


 PO  7/29/18 23:15


 8/28/18 23:14   


 


 


 Gabapentin


  (Neurontin Tab)  800 mg  BID  PRN


 PO  7/29/18 23:15


 8/28/18 23:14 Future Hold  


 


 


 Oxycodone/


 Acetaminophen


  (Percocet


 7.5-325MG Tab)  1 tab  Q8  PRN


 PO  7/29/18 23:15


 8/12/18 23:14  7/31/18 09:49


1 TAB


 


 Miscellaneous


 Information


  (Order Awaiting


 Action)  1 ea  QS


 N/A  7/30/18 08:00


 8/29/18 07:59   


 


 


 Miscellaneous


 Information


  (Order Awaiting


 Action)  1 ea  QS


 N/A  7/30/18 08:00


 8/29/18 07:59   


 


 


 Albuterol/


 Ipratropium


  (Duoneb)  3 ml  QIDR


 INH  7/30/18 08:00


 8/29/18 07:59  7/31/18 07:44


3 ML


 


 Prednisone


  (PredniSONE TAB)  40 mg  DAILY


 PO  7/30/18 09:00


 8/29/18 08:59  7/31/18 07:47


40 MG


 


 Levofloxacin 750


 mg/Prmx  150 ml @ 


 100 mls/hr  Q24H


 IV  7/30/18 02:00


 8/3/18 23:59  7/31/18 02:19


100 MLS/HR


 


 Insulin Aspart


  (novoLOG ASPART)  **SLIDING


 SCALE**


 **G...  ACHS


 SC  7/30/18 07:00


 8/29/18 06:59  7/31/18 07:50


5 UNITS


 


 Hydralazine HCl


  (Apresoline Tab)  10 mg  Q6  PRN


 PO  7/29/18 23:15


 8/28/18 23:14  7/30/18 08:08


10 MG


 


 Glucose


  (Glucose 40% Gel)  15-30


 GRAMS 15


 GRAMS...  UD  PRN


 PO  7/29/18 23:45


 8/28/18 23:44   


 


 


 Glucose


  (Glucose Chew


 Tab)  4-8


 Tablets 4


 Tabl...  UD  PRN


 PO  7/29/18 23:45


 8/28/18 23:44   


 


 


 Dextrose


  (Dextrose 50%


 50ML Syringe)  25-50ML


 25ML FOR


 ...  UD  PRN


 IV  7/29/18 23:45


 8/28/18 23:44   


 


 


 Glucagon


  (Glucagon Inj)  1 mg  UD  PRN


 IM  7/29/18 23:45


 8/28/18 23:44   


 


 


 Carbohydrates


  (Carbohydrates


 For Hypoglycemia)  15-30 GRAMS


 15 grams if


 BSG 54-69...  UD  PRN


 PO  7/29/18 23:45


 8/28/18 23:44   


 


 


 Levofloxacin


  (Consult)  1 ea  UD  PRN


 N/A  7/30/18 01:15


 8/29/18 01:14   


 


 


 Nystatin


  (Mycostatin Oint)  1 appln  BID


 EXT  7/30/18 09:00


 8/29/18 08:59  7/31/18 07:47


1 APPLN


 


 Heparin Sodium/


 Dextrose  500 ml @ 


 33 mls/hr  [id number]


 IV  7/30/18 18:45


 8/29/18 18:44  7/31/18 07:53


33 MLS/HR


 


 Miscellaneous


 Information


  (Consult


 Glycemic


 Management


 Pharmacy)  1 ea  UD  PRN


 N/A  7/31/18 08:58


 8/30/18 08:57

## 2018-07-31 NOTE — DIAGNOSTIC IMAGING REPORT
CHEST ONE VIEW PORTABLE



CLINICAL HISTORY: resp failure dyspnea



COMPARISON STUDY:  7/29/2018



FINDINGS: Improved exam. Decreased prominence of pulmonary vasculature. Mild

stable cardia megaly. Chronic elevation right hemidiaphragm. 



IMPRESSION:  Improving congestive failure. 











The above report was generated using voice recognition software.  It may contain

grammatical, syntax or spelling errors.









Electronically signed by:  Robert Thao M.D.

7/31/2018 7:08 AM



Dictated Date/Time:  7/31/2018 7:08 AM

## 2018-07-31 NOTE — PHARMACY PROGRESS NOTE
Glycemic Control Intl Consult


Date of Service


Jul 31, 2018.





Scope


Glycemic Pharmacist consulted by Nikolas Maxwell PA-C on 7/31 for glycemic control 

and to write orders per [facility] inpatient glycemic control protocol





Objective


Weight (Kilograms):  162.000


Accuchecks BSG (last 24hrs):











Test


  7/30/18


10:45 7/30/18


11:22 7/30/18


16:31 7/30/18


17:49


 


Random Glucose


  257 mg/dl


(70-99) 


  


  224 mg/dl


(70-99)


 


Bedside Glucose


  


  219 mg/dl


(70-90) 212 mg/dl


(70-90) 


 


 


Test


  7/30/18


20:36 7/31/18


04:03 7/31/18


07:44 


 


 


Bedside Glucose


  233 mg/dl


(70-90) 


  125 mg/dl


(70-90) 


 


 


Random Glucose


  


  165 mg/dl


(70-99) 


  


 








Laboratory Data (last 24hrs)











Test


  7/30/18


10:45 7/30/18


12:23 7/30/18


17:49 7/31/18


04:03


 


Anion Gap 6.0 mmol/L   5.0 mmol/L  6.0 mmol/L 


 


BUN/Creatinine Ratio 47.3   52.6  52.2 


 


Blood Urea Nitrogen 83 mg/dl   80 mg/dl  80 mg/dl 


 


Creatinine 1.75 mg/dl   1.52 mg/dl  1.54 mg/dl 


 


Potassium Level 5.6 mmol/L  5.8 mmol/L  5.7 mmol/L  5.3 mmol/L 


 


Sodium Level 136 mmol/L   135 mmol/L  136 mmol/L 


 


White Blood Count   7.84 K/uL  10.63 K/uL 


 


Red Blood Count   4.73 M/uL  4.63 M/uL 


 


Hemoglobin   14.3 g/dL  13.9 g/dL 


 


Hematocrit   46.4 %  45.1 % 


 


Mean Corpuscular Volume   98.1 fL  97.4 fL 


 


Mean Corpuscular Hemoglobin   30.2 pg  30.0 pg 


 


Mean Corpuscular Hemoglobin


Concent 


  


  30.8 g/dl 


  30.8 g/dl 


 


 


Platelet Count   241 K/uL  239 K/uL 


 


Mean Platelet Volume   10.0 fL  9.8 fL 


 


Neutrophils (%) (Auto)   88.2 %  79.4 % 


 


Lymphocytes (%) (Auto)   8.2 %  12.5 % 


 


Monocytes (%) (Auto)   2.7 %  7.4 % 


 


Eosinophils (%) (Auto)   0.1 %  0.1 % 


 


Basophils (%) (Auto)   0.0 %  0.1 % 


 


Neutrophils # (Auto)   6.92 K/uL  8.44 K/uL 


 


Lymphocytes # (Auto)   0.64 K/uL  1.33 K/uL 


 


Monocytes # (Auto)   0.21 K/uL  0.79 K/uL 


 


Eosinophils # (Auto)   0.01 K/uL  0.01 K/uL 


 


Basophils # (Auto)   0.00 K/uL  0.01 K/uL 








HbA1c











Test


  7/30/18


06:15


 


Hemoglobin A1c


  9.9 %


(4.5-5.6)  H











Recent Pertinent Medications


Outpatient Anti-diabetic Regimen: 


* Metformin 850 mg po TID


* Glimepiride 4 mg po BID


* Sitagliptin 100 mg po daily


* A1c = 9.9 % on 7/30/18








The patient is currently receiving:


* Basal insulin:              Lantus 5 units every 12 hours





* Correctional Insulin:     Novolog Correction per scale ACHS


                            Goal Range: Low 110 mg/dL - High 140 mg/dL


                            Correction Factor: 30 mg/dL/unit





* Prandial insulin:           Per carb ratio of 1 unit per 10 grams CHO consumed





* Oral Agents:               On hold








Risk Factors for Insulin Resistance:


* Steroids: Methylprednisolone 125 mg IV x1 7/29 PM then prednisone 40 mg po 

qAM starting 7/30 AM


* Infection: COPD exacerbation and/or cellulitis - on levofloxacin


* IVF: Heparin drip (mixed in D5) @ 33 mL/hr


* Diet: T2DM





Assessment & Plan


ASSESSMENT:


* 57 yo F with acute on chronic respiratory and heart failures currently in ICU


* Patient's HbA1c is not well controlled on 3 oral agents at home - would 

benefit from initiation of insulin as outpatient


* Inpatient BSG's not well controlled (all >200 mg/dL yesterday) - likely 2nd 

steroid-induced hyperglycemia and lower doses of insulin administered (for 

patient's weight)


* AM fasting BSG in range this AM despite lower dose of Lantus yesterday (10 

units administered) - patient would benefit from heavier basal load during day 

to help with post-prandial elevations, but decreased doses overnight to help 

prevent overnight/AM hypoglycemia as steroid effects dissipate.  Will therefore 

switch from Lantus to NPH


* OK to tighten Novolog parameters as all BSG's elevated yesterday despite NPO 

status - anticipate steroid effects to be more pronounced now that diet is 

resumed





PLAN FOR INPATIENT GLYCEMIC CONTROL:





* Holding outpatient oral diabetes medications





* Discontinue Lantus





* Basal insulin with NPH 12 units SC x1 now





* Correctional Insulin with NOVOLOG per scale ACHS or Q6hrs while NPO


 * Goal Range:  Low 120 mg/dL - High 150 mg/dL


 * Correction Factor: 25 mg/dL/unit


 * Nutritional / Prandial insulin per carb ratio of 1 unit per 7 grams CHO 

consumed








Discharge recommendations


* Patient would likely benefit from initiation of insulin as outpatient based 

on elevated HbA1c despite 3 oral agents as outpatient


* Will follow inpatient regimen to help predict appropriate outpatient regimen, 

noting a potential for significant differences depending on plan for steroids


* Diabetes educator consulted this AM - awaiting recommendations





* Please note that the plan above was derived based on current level of insulin 

resistance and hospital stress. These recommendations are appropriate for 

inpatient admission only. Plan of care upon discharge will need to be 

reassessed to avoid potential outpatient hypo/hyperglycemia. 





Thank you.

## 2018-07-31 NOTE — CRITICAL CARE PROGRESS NOTE
Critical Care Progress Note


Date of Service


Jul 31, 2018.





ICU Day


ICU Day Number:  2





Attending


Dr. Anguiano





Subjective


No overnight events improved compared to yesterday no significant complaints.  

Has explored gastric bypass surgery in the past and this was not encouraged by 

her primary care doctor.  Reports she previously lost weight without medication 

or surgical intervention from 400 pounds to approximately 260.  Understands 

risks and benefits of anticoagulation prefers to go on Xarelto as opposed to 

Coumadin.





Objective


General:  Alert.  nontoxic. 


     


Skin:  Warm, dry, 


   


Head:  Atraumatic 


   


Ears, nose, mouth and throat: airway patent 


   


Cardiovascular:  Normal peripheral perfusion, systolic ejection murmur


   


Respiratory:  no respiratory distress, lungs clear to auscultation bilaterally


   


Gastrointestinal:  Non distended, rotund


   


Musculoskeletal:  No deformity, 1+ pitting edema bilateral lower extremities





Assessment & Plan


PLAN:


Neuro:


Somnolence upon waking: Resolved


     -Likely related to obesity hypoventilation syndrome and possibly 

contributed by acute kidney injury


          -Check ammonia level: Within normal limits


Chronic pain


     -Patient on gabapentin 800 mg twice daily which may cause accumulation in 

the setting of acute kidney injury will hold


          -Judicious use of narcotics, observe for oversedation





Resp:


COPD secondary to tobacco abuse


Likely obesity hypoventilation syndrome


     -Patient reports that she does not follow with a pulmonologist


          -Concerned that the patient may have a pulmonary embolism accordingly 

we have started the patient on heparin


                    -In discussion with hospitalist service, patient would be 

strong candidate for long-term anticoagulation and follow-up with pulmonary for 

further evaluation.  Can discontinue end-tidal CO2 monitoring





CV:


Cor pulmonale


     -Severe pulmonary hypertension with right ventricular dilatation


          -Reviewed cardiology notes


Troponins not elevated at this time


BNP elevated





Fluids/Renal:


Acute kidney injury: Improving


     - Unknown baseline, creatinine currently 1.75


Hyperkalemia: Improving


     -Medical management


               -Reviewed nephrology consultation





ID:


Patient is on Levaquin for presumptive COPD exacerbation


     -Procalcitonin reviewed, urinalysis reviewed


          -EKG at baseline revealed QTC of 456 will monitor QTc interval, QTC 

409 today


               -Will put stop date for 5 days for Levaquin for presumptive COPD 

exacerbation





GI/Nutrition:


Anasarca


     -LFTs within normal limits





Heme:


History of DVT


     -Given clinical suspicion will place on anticoagulation





Endocrine:


Diabetes mellitus


     -ICU hyperglycemia protocol


          -A1c 10, diabetic education consult





Vascular access: Peripheral IVs


Code Status: DNR in event of cardiac arrest, patient okay with short-term 

mechanical ventilation not okay with tracheostomy nor long-term mechanical 

ventilation.





Discussed with Dr. Brandon the hospitalist service, stable for downgrade to 

telemetry status.





Data


Medications:





Current Inpatient Medications








 Medications


  (Trade)  Dose


 Ordered  Sig/Vivi


 Route  Start Time


 Stop Time Status Last Admin


Dose Admin


 


 Acetaminophen


  (Tylenol Tab)  650 mg  Q4H  PRN


 PO  7/29/18 23:15


 8/28/18 23:14   


 


 


 Al Hydrox/Mg


 Hydrox/Simethicone


  (Maalox Max Susp)  15 ml  Q4H  PRN


 PO  7/29/18 23:15


 8/28/18 23:14   


 


 


 Ondansetron HCl


  (Zofran Inj)  4 mg  Q6H  PRN


 IV  7/29/18 23:15


 8/28/18 23:14   


 


 


 Nitroglycerin


  (Nitrostat Tab)  0.4 mg  UD  PRN


 SL  7/29/18 23:15


 8/28/18 23:14   


 


 


 Polyethylene


  (Miralax Powder


 Packet)  17 gm  DAILY  PRN


 PO  7/29/18 23:15


 8/28/18 23:14   


 


 


 Gabapentin


  (Neurontin Tab)  800 mg  BID  PRN


 PO  7/29/18 23:15


 8/28/18 23:14 Future Hold  


 


 


 Oxycodone/


 Acetaminophen


  (Percocet


 7.5-325MG Tab)  1 tab  Q8  PRN


 PO  7/29/18 23:15


 8/12/18 23:14  7/30/18 21:19


1 TAB


 


 Miscellaneous


 Information


  (Order Awaiting


 Action)  1 ea  QS


 N/A  7/30/18 08:00


 8/29/18 07:59   


 


 


 Miscellaneous


 Information


  (Order Awaiting


 Action)  1 ea  QS


 N/A  7/30/18 08:00


 8/29/18 07:59   


 


 


 Albuterol/


 Ipratropium


  (Duoneb)  3 ml  QIDR


 INH  7/30/18 08:00


 8/29/18 07:59  7/31/18 07:44


3 ML


 


 Prednisone


  (PredniSONE TAB)  40 mg  DAILY


 PO  7/30/18 09:00


 8/29/18 08:59  7/31/18 07:47


40 MG


 


 Levofloxacin 750


 mg/Prmx  150 ml @ 


 100 mls/hr  Q24H


 IV  7/30/18 02:00


 8/6/18 01:59  7/31/18 02:19


100 MLS/HR


 


 Insulin Glargine


  (Lantus Solostar


 Pen)  5 units  BID


 SC  7/30/18 09:00


 8/29/18 08:59  7/31/18 07:51


5 UNITS


 


 Insulin Aspart


  (novoLOG ASPART)  **SLIDING


 SCALE**


 **G...  ACHS


 SC  7/30/18 07:00


 8/29/18 06:59  7/31/18 07:50


5 UNITS


 


 Hydralazine HCl


  (Apresoline Tab)  10 mg  Q6  PRN


 PO  7/29/18 23:15


 8/28/18 23:14  7/30/18 08:08


10 MG


 


 Glucose


  (Glucose 40% Gel)  15-30


 GRAMS 15


 GRAMS...  UD  PRN


 PO  7/29/18 23:45


 8/28/18 23:44   


 


 


 Glucose


  (Glucose Chew


 Tab)  4-8


 Tablets 4


 Tabl...  UD  PRN


 PO  7/29/18 23:45


 8/28/18 23:44   


 


 


 Dextrose


  (Dextrose 50%


 50ML Syringe)  25-50ML


 25ML FOR


 ...  UD  PRN


 IV  7/29/18 23:45


 8/28/18 23:44   


 


 


 Glucagon


  (Glucagon Inj)  1 mg  UD  PRN


 IM  7/29/18 23:45


 8/28/18 23:44   


 


 


 Carbohydrates


  (Carbohydrates


 For Hypoglycemia)  15-30 GRAMS


 15 grams if


 BSG 54-69...  UD  PRN


 PO  7/29/18 23:45


 8/28/18 23:44   


 


 


 Levofloxacin


  (Consult)  1 ea  UD  PRN


 N/A  7/30/18 01:15


 8/29/18 01:14   


 


 


 Nystatin


  (Mycostatin Oint)  1 appln  BID


 EXT  7/30/18 09:00


 8/29/18 08:59  7/31/18 07:47


1 APPLN


 


 Heparin Sodium/


 Dextrose  500 ml @ 


 33 mls/hr  [id number]


 IV  7/30/18 18:45


 8/29/18 18:44  7/31/18 07:53


33 MLS/HR


 


 Miscellaneous


 Information


  (Nursing Heparin


 Iv Rate Change)  1 ea  ONE  ONCE


 N/A  7/31/18 08:45


 7/31/18 08:46 UNV  


 








Vital Signs:











  Date Time  Temp Pulse Resp B/P (MAP) Pulse Ox O2 Delivery O2 Flow Rate FiO2


 


7/31/18 07:45  77 18   Mask 5.0 93


 


7/31/18 06:02  86 18 101/84 (90) 93 BiPAP  30


 


7/31/18 05:02 36.4 72 22 133/60 (84) 92 BiPAP  30


 


7/31/18 04:01  73 18 123/62 (82) 93 BiPAP  30


 


7/31/18 03:01  71 20 114/56 (75) 91 BiPAP  30


 


7/31/18 02:01  71 20 98/42 (60) 89   


 


7/31/18 01:18  75 20 104/47 (66) 92   


 


7/31/18 00:16 37.0 70 20 90/48 (62) 91 BiPAP  30


 


7/30/18 23:02  72 20 85/48 (60) 90   


 


7/30/18 22:44  73 20 99/45 (63) 90   


 


7/30/18 22:22  87   91   40


 


7/30/18 22:02  75 17 83/43 (56) 90 BiPAP  30


 


7/30/18 21:02  80 19 98/45 (62) 94 Nasal Cannula 5.0 


 


7/30/18 20:05  82 24 119/61 (80) 94 Nasal Cannula 5.0 


 


7/30/18 19:48     96 Nasal Cannula 5.0 97





      Mask  


 


7/30/18 19:41  74 18   Mask 4.0 97


 


7/30/18 19:02 37.0 76 18 140/61 (87) 99 Oxymask 4.0 


 


7/30/18 18:02 36.3 78 20 121/58 (79) 96 BiPAP  30


 


7/30/18 17:02  73 19 133/50 (77) 94 BiPAP  30


 


7/30/18 16:02 36.4 70 19 109/56 (73) 97 BiPAP  30


 


7/30/18 16:00      BiPAP  30


 


7/30/18 15:36  70   97   30


 


7/30/18 15:02  75 22 118/51 (73) 99 BiPAP  40


 


7/30/18 15:00  76 24  98 BiPAP  40


 


7/30/18 14:48  74 21  40 BiPAP/CPAP  95


 


7/30/18 14:48  74   95   40


 


7/30/18 12:00      BiPAP  40


 


7/30/18 11:42 36.8 78 26 129/69 (89) 96 BiPAP 4.0 


 


7/30/18 11:15  81   99   40


 


7/30/18 11:15  79 18  60 BiPAP/CPAP  99


 


7/30/18 09:50  82   95   60








Laboratory Results:





Last 24 Hours








Test


  7/30/18


09:13 7/30/18


10:45 7/30/18


11:22 7/30/18


12:23


 


Arterial Blood pH 7.18  7.16   


 


Arterial Blood Partial


Pressure CO2 72 mmHg 


  78 mmHg 


  


  


 


 


Arterial Blood Partial


Pressure O2 54 mm/Hg 


  106 mm/Hg 


  


  


 


 


Arterial Blood HCO3 26 mmol/L  27 mmol/L   


 


Arterial Blood Oxygen


Saturation 84.0 % 


  97.0 % 


  


  


 


 


Arterial Blood Base Excess -4.1 mEq/L  -3.5 mEq/L   


 


Arterial Blood Gas Delivery 4L  BI-PAP   


 


Chris Test POS  POS   


 


Sodium Level  136 mmol/L   


 


Potassium Level  5.6 mmol/L   5.8 mmol/L 


 


Chloride Level  104 mmol/L   


 


Carbon Dioxide Level  26 mmol/L   


 


Anion Gap  6.0 mmol/L   


 


Blood Urea Nitrogen  83 mg/dl   


 


Creatinine  1.75 mg/dl   


 


Est Creatinine Clear Calc


Drug Dose 


  58.4 ml/min 


  


  


 


 


Estimated GFR (


American) 


  37.1 


  


  


 


 


Estimated GFR (Non-


American 


  32.0 


  


  


 


 


BUN/Creatinine Ratio  47.3   


 


Random Glucose  257 mg/dl   


 


Calcium Level  8.3 mg/dl   


 


Bedside Glucose   219 mg/dl  


 


Test


  7/30/18


12:54 7/30/18


16:31 7/30/18


17:49 7/30/18


18:05


 


Arterial Blood pH 7.18    


 


Arterial Blood Partial


Pressure CO2 77 mmHg 


  


  


  


 


 


Arterial Blood Partial


Pressure O2 71 mm/Hg 


  


  


  


 


 


Arterial Blood HCO3 28 mmol/L    


 


Arterial Blood Oxygen


Saturation 93.0 % 


  


  


  


 


 


Arterial Blood Base Excess -2.4 mEq/L    


 


Arterial Blood Gas Delivery BI-PAP    


 


Chris Test POS    Pass 


 


Bedside Glucose  212 mg/dl   


 


White Blood Count   7.84 K/uL  


 


Red Blood Count   4.73 M/uL  


 


Hemoglobin   14.3 g/dL  


 


Hematocrit   46.4 %  


 


Mean Corpuscular Volume   98.1 fL  


 


Mean Corpuscular Hemoglobin   30.2 pg  


 


Mean Corpuscular Hemoglobin


Concent 


  


  30.8 g/dl 


  


 


 


Platelet Count   241 K/uL  


 


Mean Platelet Volume   10.0 fL  


 


Neutrophils (%) (Auto)   88.2 %  


 


Lymphocytes (%) (Auto)   8.2 %  


 


Monocytes (%) (Auto)   2.7 %  


 


Eosinophils (%) (Auto)   0.1 %  


 


Basophils (%) (Auto)   0.0 %  


 


Neutrophils # (Auto)   6.92 K/uL  


 


Lymphocytes # (Auto)   0.64 K/uL  


 


Monocytes # (Auto)   0.21 K/uL  


 


Eosinophils # (Auto)   0.01 K/uL  


 


Basophils # (Auto)   0.00 K/uL  


 


RDW Standard Deviation   59.3 fL  


 


RDW Coefficient of Variation   16.6 %  


 


Immature Granulocyte % (Auto)   0.8 %  


 


Immature Granulocyte # (Auto)   0.06 K/uL  


 


Prothrombin Time   11.8 SECONDS  


 


Prothromb Time International


Ratio 


  


  1.1 


  


 


 


Activated Partial


Thromboplast Time 


  


  24.9 SECONDS 


  


 


 


Partial Thromboplastin Ratio   1.0  


 


Sodium Level   135 mmol/L  


 


Potassium Level   5.7 mmol/L  


 


Carbon Dioxide Level   27 mmol/L  


 


Anion Gap   5.0 mmol/L  


 


Blood Urea Nitrogen   80 mg/dl  


 


Creatinine   1.52 mg/dl  


 


Est Creatinine Clear Calc


Drug Dose 


  


  67.2 ml/min 


  


 


 


Estimated GFR (


American) 


  


  44.0 


  


 


 


Estimated GFR (Non-


American 


  


  37.9 


  


 


 


BUN/Creatinine Ratio   52.6  


 


Random Glucose   224 mg/dl  


 


Calcium Level   9.0 mg/dl  


 


Troponin I   < 0.015 ng/ml  


 


Blood Gas Sample Site    L Radial 


 


Bedside Blood Gas pH (LAB)    7.28 


 


Bedside Blood Gas pCO2 (LAB)    62 mmHg 


 


Bedside Blood Gas pO2 (LAB)    62 mmHg 


 


Bedside Blood Gas HCO3 (LAB)    29 meq/L 


 


Bedside Blood Gas Total CO2    31 mEq/l 


 


Bedside Blood Gas Base Excess


(LAB) 


  


  


  2.0 meq/L 


 


 


Bedside Blood Gas O2


Saturation 


  


  


  89.0 % 


 


 


Oxygen Delivery Device    BIPAP 


 


Bedside FiO2    30 % 


 


Blood Gas IPAP    18 


 


Test


  7/30/18


19:56 7/30/18


19:57 7/30/18


20:36 7/31/18


00:43


 


Procalcitonin 0.17 ng/ml    


 


Lactic Acid Level  0.9 mmol/L   


 


Total Bilirubin  0.6 mg/dl   


 


Direct Bilirubin  0.2 mg/dl   


 


Aspartate Amino Transf


(AST/SGOT) 


  25 U/L 


  


  


 


 


Alanine Aminotransferase


(ALT/SGPT) 


  38 U/L 


  


  


 


 


Alkaline Phosphatase  78 U/L   


 


Ammonia  25.2 umol/L   


 


Total Protein  8.0 gm/dl   


 


Albumin  3.1 gm/dl   


 


Bedside Glucose   233 mg/dl  


 


Activated Partial


Thromboplast Time 


  


  


  85.7 SECONDS 


 


 


Partial Thromboplastin Ratio    3.3 


 


Test


  7/31/18


03:30 7/31/18


04:03 7/31/18


07:44 7/31/18


07:51


 


Urine Color YELLOW    


 


Urine Appearance CLOUDY    


 


Urine pH 5.0    


 


Urine Specific Gravity 1.018    


 


Urine Protein NEG    


 


Urine Glucose (UA) NEG    


 


Urine Ketones NEG    


 


Urine Occult Blood 3+    


 


Urine Nitrite NEG    


 


Urine Bilirubin NEG    


 


Urine Urobilinogen NEG    


 


Urine Leukocyte Esterase TRACE    


 


Urine WBC (Auto) 1-5 /hpf    


 


Urine RBC (Auto) >30 /hpf    


 


Urine Hyaline Casts (Auto) >30 /lpf    


 


Urine Epithelial Cells (Auto) 5-10 /lpf    


 


Urine Bacteria (Auto) NEG    


 


Urine Pathogenic Casts  /lpf    


 


White Blood Count  10.63 K/uL   


 


Red Blood Count  4.63 M/uL   


 


Hemoglobin  13.9 g/dL   


 


Hematocrit  45.1 %   


 


Mean Corpuscular Volume  97.4 fL   


 


Mean Corpuscular Hemoglobin  30.0 pg   


 


Mean Corpuscular Hemoglobin


Concent 


  30.8 g/dl 


  


  


 


 


Platelet Count  239 K/uL   


 


Mean Platelet Volume  9.8 fL   


 


Neutrophils (%) (Auto)  79.4 %   


 


Lymphocytes (%) (Auto)  12.5 %   


 


Monocytes (%) (Auto)  7.4 %   


 


Eosinophils (%) (Auto)  0.1 %   


 


Basophils (%) (Auto)  0.1 %   


 


Neutrophils # (Auto)  8.44 K/uL   


 


Lymphocytes # (Auto)  1.33 K/uL   


 


Monocytes # (Auto)  0.79 K/uL   


 


Eosinophils # (Auto)  0.01 K/uL   


 


Basophils # (Auto)  0.01 K/uL   


 


RDW Standard Deviation  58.3 fL   


 


RDW Coefficient of Variation  16.7 %   


 


Immature Granulocyte % (Auto)  0.5 %   


 


Immature Granulocyte # (Auto)  0.05 K/uL   


 


Activated Partial


Thromboplast Time 


  70.0 SECONDS 


  


  67.5 SECONDS 


 


 


Partial Thromboplastin Ratio  2.7   2.6 


 


Sodium Level  136 mmol/L   


 


Potassium Level  5.3 mmol/L   


 


Chloride Level  102 mmol/L   


 


Carbon Dioxide Level  28 mmol/L   


 


Anion Gap  6.0 mmol/L   


 


Blood Urea Nitrogen  80 mg/dl   


 


Creatinine  1.54 mg/dl   


 


Est Creatinine Clear Calc


Drug Dose 


  66.3 ml/min 


  


  


 


 


Estimated GFR (


American) 


  43.3 


  


  


 


 


Estimated GFR (Non-


American 


  37.3 


  


  


 


 


BUN/Creatinine Ratio  52.2   


 


Random Glucose  165 mg/dl   


 


Calcium Level  8.7 mg/dl   


 


Magnesium Level  2.0 mg/dl   


 


Troponin I  0.016 ng/ml   


 


Bedside Glucose   125 mg/dl

## 2018-07-31 NOTE — CARDIOLOGY FOLLOW-UP
Subjective


General


Date of Service:


Jul 31, 2018.


Pt evaluation today including:  conversation w/ patient, physical exam, chart 

review, lab review, review of studies, review of inpatient medication list





History of Present Illness


Patient sitting up and resting comfortably this afternoon. Much more awake/

alert. Reports SOB and "fluid" has improved. Ongoing dyspnea noted with minimal 

movement. No chest pain. No dizziness. Ongoing edema and abdominal distention 

reported. Good diuresis over the last 24 hours





Allergies


Coded Allergies:  


     No Known Allergies (Unverified , 8/14/12)





Social History


Smoking Status:  Current Every Day Smoker


Hx Tobacco Use In Past Year?:  Yes


Hx Substance Use - Type And Am:  No





Problem List


Medical Problems:


(1) Acute kidney injury


Status: Acute  





(2) CHF exacerbation


Status: Acute  





(3) COPD exacerbation


Status: Acute  





(4) Hypokalemia


Status: Acute  





(5) Hypoxia


Status: Acute  





(6) Respiratory acidosis


Status: Acute  











Review of Systems


Respiratory:  + cough, + wheezing, + shortness of breath, + dyspnea on exertion

, No dyspnea at rest, No hemoptysis


Cardiac:  + orthopnea, + edema, No chest pain, No PND, No palpitations





Physical Exam


Vital Signs





Last Vital Signs Documentation








  Date Time  Temp Pulse Resp B/P (MAP) Pulse Ox O2 Delivery O2 Flow Rate FiO2


 


7/31/18 11:20  109 18  96 Room Air  


 


7/31/18 09:02    123/73 (90)   5.0 


 


7/31/18 08:01 36.4       


 


7/31/18 07:45        93











Physical Exam


Constitutional:  


   General Apperance:  obese


   Level of Distress:  NAD, acutely ill, chronically ill


Psychiatric:  


   Mental Status:  active & alert


   Orientation:  to time, to place, to person


Eyes:  


   Pupils:  PERRLA


Lungs:  


   Auscultation:  expiratory wheezing, wet rales/crackles


Cardiovascular:  


   Heart Auscultation:  RRR, no murmurs


Abdomen:  


   Bowel Sounds:  normal


   Inspection & Palpation:  soft, non-distended


Extremities:  edema (2+ edema to hips with chronic stasis changes)





Assessment and Plan


Assessment and Plan


1. Acute respiratory failure with hypoxia secondary to probable acute diastolic 

vs acute right heart failure, pickwickian


2. Unable to r/o PE - started on IV heparin. History of DVT. May benefit from 

lifelong anticoagulation due to immobility


3 COURTNEY with history of non compliance with CPAP


3. COPD wiht ongoing tobacco abuse


4. Patient reports history of DVT, no  longer on anticoagulation therapy. 


5. Hypertension


6. MARGIE with hyperkalemia.


7. Abnormal EKG, negative cardiac enzymes x2








PLAN: 


Continue IV furosemide with metolazone


Monitor I+O's


No supplemental potassium. 


Lisinopril on hold. 


Monitor K


CPAP/BIPAP therapy and supplemental O2. 


continue IV heparin with possible transition to coumadin rather than NOAC due 

to unpredictable pharmacokinetics given obesity and renal insufficiency. 








Case discussed with Dr. Romero. Will follow


CARDIOLOGY ATTENDING ADDENDUM: 


The patient was seen and personally examined.


Agree with Syeda Mcclure PA-C's findings and plans as documented above.


Patient was more alert this morning when I spoke to her in the intensive care 

unit this morning.


She states that she was prescribed CPAP approximately 8 years ago but she did 

not tolerate it.  Therefore she is known to have the diagnosis of obstructive 

sleep apnea.


She told me that she had been on Xarelto for DVT in the past for about a year 

and it sounds as though it was discontinued because she completed the expected 

course of therapy.








She does not recall ever having had any past cardiac tests such as the past 

echocardiogram or cardiac catheterization.  She recalls that the emergency room 

department visit in [place] at which time she was given the clinical 

diagnosis of congestive heart failure was about a month ago.





Exam:


Heart sounds are distant due to body habitus, lower extremity edema partially 

improved.


Neuro, somnolent





Impression: 58-year-old female with obesity hypoventilation syndrome having 

presented with volume overload, acute kidney injury, hyperkalemia, and 

hypercapnic respiratory failure.  Echocardiographic findings consistent with 

cor pulmonale physiology of unknown chronicity with dilated right ventricular 

chamber size and severe right ventricular systolic dysfunction.  The left 

ventricle chamber size is small by comparison with hyperdynamic LVEF.  EKG 

reveals diffuse inferior and lateral ST segment changes consistent with 

ischemia again today however troponins have been negative and she has no matilde 

angina.





Plan:


Continue unfractionated heparin for possible underlying pulmonary embolism 

versus chronic thromboembolic pulmonary hypertension, as well as myocardial 

ischemia.  Nephrology input is noted and appreciated given diuretic dosing and 

treatment of hyperkalemia.  Agree with furosemide 80 mg.  Continue BiPAP on an 

as-needed basis.





-Patient reassessed at 1:50 PM out of bed in chair in the ICU, stable for 

transfer back to telemetry with BiPAP as needed and with sleep








Laboratory Results





Last 24 Hours








Test


  7/30/18


12:23 7/30/18


12:54 7/30/18


16:31 7/30/18


17:49


 


Potassium Level 5.8 mmol/L    5.7 mmol/L 


 


Arterial Blood pH  7.18   


 


Arterial Blood Partial


Pressure CO2 


  77 mmHg 


  


  


 


 


Arterial Blood Partial


Pressure O2 


  71 mm/Hg 


  


  


 


 


Arterial Blood HCO3  28 mmol/L   


 


Arterial Blood Oxygen


Saturation 


  93.0 % 


  


  


 


 


Arterial Blood Base Excess  -2.4 mEq/L   


 


Arterial Blood Gas Delivery  BI-PAP   


 


Chris Test  POS   


 


Bedside Glucose   212 mg/dl  


 


White Blood Count    7.84 K/uL 


 


Red Blood Count    4.73 M/uL 


 


Hemoglobin    14.3 g/dL 


 


Hematocrit    46.4 % 


 


Mean Corpuscular Volume    98.1 fL 


 


Mean Corpuscular Hemoglobin    30.2 pg 


 


Mean Corpuscular Hemoglobin


Concent 


  


  


  30.8 g/dl 


 


 


Platelet Count    241 K/uL 


 


Mean Platelet Volume    10.0 fL 


 


Neutrophils (%) (Auto)    88.2 % 


 


Lymphocytes (%) (Auto)    8.2 % 


 


Monocytes (%) (Auto)    2.7 % 


 


Eosinophils (%) (Auto)    0.1 % 


 


Basophils (%) (Auto)    0.0 % 


 


Neutrophils # (Auto)    6.92 K/uL 


 


Lymphocytes # (Auto)    0.64 K/uL 


 


Monocytes # (Auto)    0.21 K/uL 


 


Eosinophils # (Auto)    0.01 K/uL 


 


Basophils # (Auto)    0.00 K/uL 


 


RDW Standard Deviation    59.3 fL 


 


RDW Coefficient of Variation    16.6 % 


 


Immature Granulocyte % (Auto)    0.8 % 


 


Immature Granulocyte # (Auto)    0.06 K/uL 


 


Prothrombin Time    11.8 SECONDS 


 


Prothromb Time International


Ratio 


  


  


  1.1 


 


 


Activated Partial


Thromboplast Time 


  


  


  24.9 SECONDS 


 


 


Partial Thromboplastin Ratio    1.0 


 


Sodium Level    135 mmol/L 


 


Carbon Dioxide Level    27 mmol/L 


 


Anion Gap    5.0 mmol/L 


 


Blood Urea Nitrogen    80 mg/dl 


 


Creatinine    1.52 mg/dl 


 


Est Creatinine Clear Calc


Drug Dose 


  


  


  67.2 ml/min 


 


 


Estimated GFR (


American) 


  


  


  44.0 


 


 


Estimated GFR (Non-


American 


  


  


  37.9 


 


 


BUN/Creatinine Ratio    52.6 


 


Random Glucose    224 mg/dl 


 


Calcium Level    9.0 mg/dl 


 


Troponin I    < 0.015 ng/ml 


 


Test


  7/30/18


18:05 7/30/18


19:56 7/30/18


19:57 7/30/18


20:36


 


Blood Gas Sample Site L Radial    


 


Bedside Blood Gas pH (LAB) 7.28    


 


Bedside Blood Gas pCO2 (LAB) 62 mmHg    


 


Bedside Blood Gas pO2 (LAB) 62 mmHg    


 


Bedside Blood Gas HCO3 (LAB) 29 meq/L    


 


Bedside Blood Gas Total CO2 31 mEq/l    


 


Bedside Blood Gas Base Excess


(LAB) 2.0 meq/L 


  


  


  


 


 


Bedside Blood Gas O2


Saturation 89.0 % 


  


  


  


 


 


Chris Test Pass    


 


Oxygen Delivery Device BIPAP    


 


Bedside FiO2 30 %    


 


Blood Gas IPAP 18    


 


Procalcitonin  0.17 ng/ml   


 


Lactic Acid Level   0.9 mmol/L  


 


Total Bilirubin   0.6 mg/dl  


 


Direct Bilirubin   0.2 mg/dl  


 


Aspartate Amino Transf


(AST/SGOT) 


  


  25 U/L 


  


 


 


Alanine Aminotransferase


(ALT/SGPT) 


  


  38 U/L 


  


 


 


Alkaline Phosphatase   78 U/L  


 


Ammonia   25.2 umol/L  


 


Total Protein   8.0 gm/dl  


 


Albumin   3.1 gm/dl  


 


Bedside Glucose    233 mg/dl 


 


Test


  7/31/18


00:43 7/31/18


03:30 7/31/18


04:03 7/31/18


07:44


 


Activated Partial


Thromboplast Time 85.7 SECONDS 


  


  70.0 SECONDS 


  


 


 


Partial Thromboplastin Ratio 3.3   2.7  


 


Urine Color  YELLOW   


 


Urine Appearance  CLOUDY   


 


Urine pH  5.0   


 


Urine Specific Gravity  1.018   


 


Urine Protein  NEG   


 


Urine Glucose (UA)  NEG   


 


Urine Ketones  NEG   


 


Urine Occult Blood  3+   


 


Urine Nitrite  NEG   


 


Urine Bilirubin  NEG   


 


Urine Urobilinogen  NEG   


 


Urine Leukocyte Esterase  TRACE   


 


Urine WBC (Auto)  1-5 /hpf   


 


Urine RBC (Auto)  >30 /hpf   


 


Urine Hyaline Casts (Auto)  >30 /lpf   


 


Urine Epithelial Cells (Auto)  5-10 /lpf   


 


Urine Bacteria (Auto)  NEG   


 


Urine Pathogenic Casts   /lpf   


 


White Blood Count   10.63 K/uL  


 


Red Blood Count   4.63 M/uL  


 


Hemoglobin   13.9 g/dL  


 


Hematocrit   45.1 %  


 


Mean Corpuscular Volume   97.4 fL  


 


Mean Corpuscular Hemoglobin   30.0 pg  


 


Mean Corpuscular Hemoglobin


Concent 


  


  30.8 g/dl 


  


 


 


Platelet Count   239 K/uL  


 


Mean Platelet Volume   9.8 fL  


 


Neutrophils (%) (Auto)   79.4 %  


 


Lymphocytes (%) (Auto)   12.5 %  


 


Monocytes (%) (Auto)   7.4 %  


 


Eosinophils (%) (Auto)   0.1 %  


 


Basophils (%) (Auto)   0.1 %  


 


Neutrophils # (Auto)   8.44 K/uL  


 


Lymphocytes # (Auto)   1.33 K/uL  


 


Monocytes # (Auto)   0.79 K/uL  


 


Eosinophils # (Auto)   0.01 K/uL  


 


Basophils # (Auto)   0.01 K/uL  


 


RDW Standard Deviation   58.3 fL  


 


RDW Coefficient of Variation   16.7 %  


 


Immature Granulocyte % (Auto)   0.5 %  


 


Immature Granulocyte # (Auto)   0.05 K/uL  


 


Sodium Level   136 mmol/L  


 


Potassium Level   5.3 mmol/L  


 


Chloride Level   102 mmol/L  


 


Carbon Dioxide Level   28 mmol/L  


 


Anion Gap   6.0 mmol/L  


 


Blood Urea Nitrogen   80 mg/dl  


 


Creatinine   1.54 mg/dl  


 


Est Creatinine Clear Calc


Drug Dose 


  


  66.3 ml/min 


  


 


 


Estimated GFR (


American) 


  


  43.3 


  


 


 


Estimated GFR (Non-


American 


  


  37.3 


  


 


 


BUN/Creatinine Ratio   52.2  


 


Random Glucose   165 mg/dl  


 


Calcium Level   8.7 mg/dl  


 


Magnesium Level   2.0 mg/dl  


 


Troponin I   0.016 ng/ml  


 


Bedside Glucose    125 mg/dl 


 


Test


  7/31/18


07:51 7/31/18


11:14 


  


 


 


Activated Partial


Thromboplast Time 67.5 SECONDS 


  


  


  


 


 


Partial Thromboplastin Ratio 2.6    


 


Bedside Glucose  105 mg/dl

## 2018-08-01 VITALS — HEART RATE: 68 BPM | OXYGEN SATURATION: 95 %

## 2018-08-01 VITALS
DIASTOLIC BLOOD PRESSURE: 84 MMHG | HEART RATE: 75 BPM | TEMPERATURE: 97.7 F | OXYGEN SATURATION: 96 % | SYSTOLIC BLOOD PRESSURE: 139 MMHG

## 2018-08-01 VITALS — OXYGEN SATURATION: 88 % | HEART RATE: 78 BPM

## 2018-08-01 VITALS
SYSTOLIC BLOOD PRESSURE: 104 MMHG | HEART RATE: 78 BPM | TEMPERATURE: 98.42 F | DIASTOLIC BLOOD PRESSURE: 69 MMHG | OXYGEN SATURATION: 96 %

## 2018-08-01 VITALS — HEART RATE: 79 BPM | OXYGEN SATURATION: 94 %

## 2018-08-01 VITALS
DIASTOLIC BLOOD PRESSURE: 88 MMHG | HEART RATE: 71 BPM | OXYGEN SATURATION: 98 % | TEMPERATURE: 98.06 F | SYSTOLIC BLOOD PRESSURE: 147 MMHG

## 2018-08-01 VITALS
DIASTOLIC BLOOD PRESSURE: 80 MMHG | OXYGEN SATURATION: 91 % | HEART RATE: 72 BPM | SYSTOLIC BLOOD PRESSURE: 128 MMHG | TEMPERATURE: 97.52 F

## 2018-08-01 VITALS
OXYGEN SATURATION: 97 % | SYSTOLIC BLOOD PRESSURE: 107 MMHG | TEMPERATURE: 97.88 F | HEART RATE: 78 BPM | DIASTOLIC BLOOD PRESSURE: 63 MMHG

## 2018-08-01 VITALS
SYSTOLIC BLOOD PRESSURE: 134 MMHG | TEMPERATURE: 97.7 F | DIASTOLIC BLOOD PRESSURE: 83 MMHG | HEART RATE: 88 BPM | OXYGEN SATURATION: 95 %

## 2018-08-01 VITALS — OXYGEN SATURATION: 88 % | HEART RATE: 98 BPM

## 2018-08-01 VITALS
TEMPERATURE: 97.34 F | OXYGEN SATURATION: 91 % | HEART RATE: 78 BPM | DIASTOLIC BLOOD PRESSURE: 79 MMHG | SYSTOLIC BLOOD PRESSURE: 126 MMHG

## 2018-08-01 LAB
BASOPHILS # BLD: 0.01 K/UL (ref 0–0.2)
BASOPHILS NFR BLD: 0.1 %
BUN SERPL-MCNC: 65 MG/DL (ref 7–18)
CALCIUM SERPL-MCNC: 9 MG/DL (ref 8.5–10.1)
CO2 SERPL-SCNC: 29 MMOL/L (ref 21–32)
CREAT SERPL-MCNC: 1.21 MG/DL (ref 0.6–1.2)
EOS ABS #: 0.03 K/UL (ref 0–0.5)
EOSINOPHIL NFR BLD AUTO: 231 K/UL (ref 130–400)
GLUCOSE SERPL-MCNC: 127 MG/DL (ref 70–99)
HCT VFR BLD CALC: 44.2 % (ref 37–47)
HGB BLD-MCNC: 13.9 G/DL (ref 12–16)
IG#: 0.03 K/UL (ref 0–0.02)
IMM GRANULOCYTES NFR BLD AUTO: 19.1 %
INR PPP: 1.1 (ref 0.9–1.1)
LYMPHOCYTES # BLD: 2.05 K/UL (ref 1.2–3.4)
MCH RBC QN AUTO: 29.8 PG (ref 25–34)
MCHC RBC AUTO-ENTMCNC: 31.4 G/DL (ref 32–36)
MCV RBC AUTO: 94.8 FL (ref 80–100)
MONO ABS #: 0.77 K/UL (ref 0.11–0.59)
MONOCYTES NFR BLD: 7.2 %
NEUT ABS #: 7.84 K/UL (ref 1.4–6.5)
NEUTROPHILS # BLD AUTO: 0.3 %
NEUTROPHILS NFR BLD AUTO: 73 %
PMV BLD AUTO: 10 FL (ref 7.4–10.4)
POTASSIUM SERPL-SCNC: 4.7 MMOL/L (ref 3.5–5.1)
PTT PATIENT: 66.3 SECONDS (ref 21–31)
RED CELL DISTRIBUTION WIDTH CV: 16.4 % (ref 11.5–14.5)
RED CELL DISTRIBUTION WIDTH SD: 56.4 FL (ref 36.4–46.3)
SODIUM SERPL-SCNC: 134 MMOL/L (ref 136–145)
WBC # BLD AUTO: 10.73 K/UL (ref 4.8–10.8)

## 2018-08-01 RX ADMIN — OXYCODONE AND ACETAMINOPHEN PRN TAB: 7.5; 325 TABLET ORAL at 08:13

## 2018-08-01 RX ADMIN — NYSTATIN SCH APPLN: 100000 OINTMENT TOPICAL at 20:31

## 2018-08-01 RX ADMIN — INSULIN ASPART SCH UNITS: 100 INJECTION, SOLUTION INTRAVENOUS; SUBCUTANEOUS at 16:46

## 2018-08-01 RX ADMIN — FUROSEMIDE SCH MLS/MIN: 10 INJECTION, SOLUTION INTRAMUSCULAR; INTRAVENOUS at 08:11

## 2018-08-01 RX ADMIN — IPRATROPIUM BROMIDE AND ALBUTEROL SULFATE SCH ML: .5; 3 SOLUTION RESPIRATORY (INHALATION) at 11:06

## 2018-08-01 RX ADMIN — INSULIN ASPART SCH UNITS: 100 INJECTION, SOLUTION INTRAVENOUS; SUBCUTANEOUS at 08:06

## 2018-08-01 RX ADMIN — ALUMINUM ZIRCONIUM TRICHLOROHYDREX GLY SCH EA: 0.2 STICK TOPICAL at 16:00

## 2018-08-01 RX ADMIN — HEPARIN SODIUM SCH MLS/HR: 5000 INJECTION, SOLUTION INTRAVENOUS at 00:27

## 2018-08-01 RX ADMIN — INSULIN ASPART SCH UNITS: 100 INJECTION, SOLUTION INTRAVENOUS; SUBCUTANEOUS at 20:35

## 2018-08-01 RX ADMIN — HEPARIN SODIUM SCH MLS/HR: 5000 INJECTION, SOLUTION INTRAVENOUS at 16:47

## 2018-08-01 RX ADMIN — OXYCODONE AND ACETAMINOPHEN PRN TAB: 7.5; 325 TABLET ORAL at 23:16

## 2018-08-01 RX ADMIN — IPRATROPIUM BROMIDE AND ALBUTEROL SULFATE SCH ML: .5; 3 SOLUTION RESPIRATORY (INHALATION) at 18:55

## 2018-08-01 RX ADMIN — FUROSEMIDE SCH MLS/MIN: 10 INJECTION, SOLUTION INTRAMUSCULAR; INTRAVENOUS at 14:04

## 2018-08-01 RX ADMIN — IPRATROPIUM BROMIDE AND ALBUTEROL SULFATE SCH ML: .5; 3 SOLUTION RESPIRATORY (INHALATION) at 06:58

## 2018-08-01 RX ADMIN — NYSTATIN SCH APPLN: 100000 OINTMENT TOPICAL at 11:40

## 2018-08-01 RX ADMIN — ALUMINUM ZIRCONIUM TRICHLOROHYDREX GLY SCH EA: 0.2 STICK TOPICAL at 08:00

## 2018-08-01 RX ADMIN — METOLAZONE SCH MG: 5 TABLET ORAL at 08:12

## 2018-08-01 RX ADMIN — LEVOFLOXACIN SCH MG: 750 TABLET, FILM COATED ORAL at 12:17

## 2018-08-01 RX ADMIN — INSULIN ASPART SCH UNITS: 100 INJECTION, SOLUTION INTRAVENOUS; SUBCUTANEOUS at 11:40

## 2018-08-01 RX ADMIN — IPRATROPIUM BROMIDE AND ALBUTEROL SULFATE SCH ML: .5; 3 SOLUTION RESPIRATORY (INHALATION) at 15:24

## 2018-08-01 NOTE — CARDIOLOGY FOLLOW-UP
Subjective


General


Date of Service:


Aug 1, 2018.


Chief Complaint:  resp failure; right heart failure


Pt evaluation today including:  conversation w/ patient, physical exam, chart 

review, lab review, review of inpatient medication list





History of Present Illness


Patient reports feeling well this morning.  She is sitting up out of bed.  

Shortness of breath continues to improve.  She reports improvement in her lower 

extremity edema and abdominal bloating.  She is tolerating high-dose diuretics.

  No chest pain.  No dizziness, syncope or near syncope.  She reports she will 

be returning to [place] upon discharge.





Allergies


Coded Allergies:  


     No Known Allergies (Unverified , 8/14/12)





Social History


Smoking Status:  Current Every Day Smoker


Hx Tobacco Use In Past Year?:  Yes


Hx Substance Use - Type And Am:  No





Problem List


Medical Problems:


(1) Acute kidney injury


Status: Acute  





(2) CHF exacerbation


Status: Acute  





(3) COPD exacerbation


Status: Acute  





(4) Hypokalemia


Status: Acute  





(5) Hypoxia


Status: Acute  





(6) Respiratory acidosis


Status: Acute  











Review of Systems


Respiratory:  + dyspnea on exertion, No cough, No wheezing, No dyspnea at rest, 

No hemoptysis


Cardiac:  + edema, No chest pain, No orthopnea, No PND, No palpitations





Physical Exam


Vital Signs





Last Vital Signs Documentation








  Date Time  Temp Pulse Resp B/P (MAP) Pulse Ox O2 Delivery O2 Flow Rate FiO2


 


8/1/18 08:00      Room Air  


 


8/1/18 07:51 36.5 75 18 139/84 (102) 96  3.0 


 


7/31/18 23:45        30











Physical Exam


Constitutional:  


   General Apperance:  obese


   Level of Distress:  NAD, acutely ill, chronically ill


Psychiatric:  


   Mental Status:  active & alert


   Orientation:  to time, to place, to person


Eyes:  


   Pupils:  PERRLA


Neck:  supple


Lungs:  


   Auscultation:  expiratory wheezing, wet rales/crackles


Cardiovascular:  


   Heart Auscultation:  RRR, no murmurs


Abdomen:  


   Bowel Sounds:  normal


   Inspection & Palpation:  soft, non-distended


Extremities:  edema





Assessment and Plan


Assessment and Plan


1. Acute respiratory failure with hypoxia secondary to probable acute diastolic 

vs acute right heart failure, pickwickian


2. Unable to r/o PE - started on IV heparin. History of DVT. May benefit from 

lifelong anticoagulation due to immobility


3 COURTNEY with history of non compliance with CPAP


3. COPD wiht ongoing tobacco abuse


4. Patient reports history of DVT, no  longer on anticoagulation therapy. 


5. Hypertension


6. MARGIE with hyperkalemia.-Improved


7. Abnormal EKG, negative cardiac enzymes x4, normal LV function at rest, with 

severe right ventricular chamber size and right ventricular hypokinesis





PLAN: 


Continue IV furosemide with metolazone with ongoing aggressive diuresis. 


Potassium and creatinine continue to improve 


Lisinopril on hold. 


Continue IV heparin to Coumadin which is preferred anticoagulant in this 

setting with MARGIE/CKD and obesity.  NOAC's are not advised in patient's with BMI 

>40 in this setting due to unpredictable pharmacokinetics and possible lack of 

efficacy.


CPAP/BIPAP therapy and supplemental O2. 


She may benefit from future ischemic work up as outpatient after respiratory 

status and volume status improves. 


She will need to f/u closely with PCP, cardiology, sleep med, and pulmonary 

department upon discharge when she returns to [place]. 





Case discussed with Dr. Romero. Will follow.


CARDIOLOGY ATTENDING ADDENDUM: 


The patient was seen and personally examined.


Agree with Syeda Mcclure PA-C's findings and plans as documented above.


Patient reassessed by the undersigned, she is in room 229-1


.  She is out of bed in the chair, is conversant, and comfortable.  She notes 

that she still feels that she is swollen her abdomen, but she feels that she 

has lost a considerable amount of fluid.  Per her intake and output summary, 

she had 4.2 years of fluid output on 7/30/18 with a fluid balance of -2 L on 

that day, and yesterday, she had 3.6 L of urine output, and her fluid balance 

was -1.5 L.  She has had 4.2 L of urine output thus far today.  Her creatinine 

has trended toward improvement, with creatinine level of 1.21 mg/dL today as 

compared to 1.84 on admission.  Potassium has now normalized.





Exam:


Lungs clear


Extremities mild residual edema





Impression: As noted above


Etiology of her right ventricular dysfunction is likely due to obesity 

hypoventilation syndrome with previously untreated severe obstructive sleep 

apnea, although acute or chronic pulmonary embolism or even chronic 

thromboembolic pulmonary hypertension is a possibility.  Her clinical 

presentation is less suggestive of an underlying primary pulmonary hypertension 

or pulmonary hypertension due to connective tissue disease





Plan:


Continue aggressive diuretic therapy, furosemide 80 mg IV twice daily.  

Continue heparin.  This time will proceed with empiric anticoagulation.  Given 

the patient's obesity and history of acute kidney injury, warfarin is felt to 

be the anticoagulant of choice for her.





She has had a dramatic clinical improvement.  Although her EKG does show ST 

segment depression she has no matilde anginal symptoms.  At this time, I do not 

think ischemic workup is clinically necessary we should work on optimizing her 

volume status and anticoagulation instead.  I do not think her presenting 

symptoms were suggestive of coronary insufficiency.





Laboratory Results





Last 24 Hours








Test


  7/31/18


11:14 7/31/18


15:55 7/31/18


20:29 8/1/18


05:20


 


Bedside Glucose 105 mg/dl  274 mg/dl  242 mg/dl  


 


White Blood Count    10.73 K/uL 


 


Red Blood Count    4.66 M/uL 


 


Hemoglobin    13.9 g/dL 


 


Hematocrit    44.2 % 


 


Mean Corpuscular Volume    94.8 fL 


 


Mean Corpuscular Hemoglobin    29.8 pg 


 


Mean Corpuscular Hemoglobin


Concent 


  


  


  31.4 g/dl 


 


 


Platelet Count    231 K/uL 


 


Mean Platelet Volume    10.0 fL 


 


Neutrophils (%) (Auto)    73.0 % 


 


Lymphocytes (%) (Auto)    19.1 % 


 


Monocytes (%) (Auto)    7.2 % 


 


Eosinophils (%) (Auto)    0.3 % 


 


Basophils (%) (Auto)    0.1 % 


 


Neutrophils # (Auto)    7.84 K/uL 


 


Lymphocytes # (Auto)    2.05 K/uL 


 


Monocytes # (Auto)    0.77 K/uL 


 


Eosinophils # (Auto)    0.03 K/uL 


 


Basophils # (Auto)    0.01 K/uL 


 


RDW Standard Deviation    56.4 fL 


 


RDW Coefficient of Variation    16.4 % 


 


Immature Granulocyte % (Auto)    0.3 % 


 


Immature Granulocyte # (Auto)    0.03 K/uL 


 


Prothrombin Time    11.8 SECONDS 


 


Prothromb Time International


Ratio 


  


  


  1.1 


 


 


Activated Partial


Thromboplast Time 


  


  


  66.3 SECONDS 


 


 


Partial Thromboplastin Ratio    2.6 


 


Sodium Level    134 mmol/L 


 


Potassium Level     mmol/L 


 


Chloride Level    99 mmol/L 


 


Carbon Dioxide Level    29 mmol/L 


 


Anion Gap    6.0 mmol/L 


 


Blood Urea Nitrogen    65 mg/dl 


 


Creatinine    1.21 mg/dl 


 


Est Creatinine Clear Calc


Drug Dose 


  


  


  83.1 ml/min 


 


 


Estimated GFR (


American) 


  


  


  57.9 


 


 


Estimated GFR (Non-


American 


  


  


  50.0 


 


 


BUN/Creatinine Ratio    53.6 


 


Random Glucose    127 mg/dl 


 


Calcium Level    9.0 mg/dl 


 


Magnesium Level     mg/dl 


 


Test


  8/1/18


07:02 8/1/18


07:25 


  


 


 


Potassium Level 4.7 mmol/L    


 


Magnesium Level 1.8 mg/dl    


 


Bedside Glucose  119 mg/dl

## 2018-08-01 NOTE — PROGRESS NOTE
DATE: 08/01/2018

 

SUBJECTIVE:  Overnight she is doing good.  She is urinating a lot.  Not all

of the urine output is actually captured on the input output charting, but

she is absolutely confident that she is making a lot of urine and with that

her breathing is better.  Her lower extremity edema as well as abdominal

distention is getting better.

 

OBJECTIVE:

VITAL SIGNS:  Blood pressure 139/84, 96% on 3-liter nasal cannula, pulse rate

75 per minute, temperature 36.5.

HEENT:  Mucous membrane is moist.

NECK:  Short and obese.  Cannot assess JVD.

CHEST:  Decreased breath sounds.  Unable to hear any breath sounds secondary

to morbid obesity.

CARDIOVASCULAR:  S1 and S2, regular.

ABDOMEN:  Soft, nontender, very obese.

EXTREMITIES:  Shows bilateral lower extremity edema, but it is definitely

less than 2 days ago.

 

LABORATORY TESTS:  From this morning shows sodium 134, potassium 4.7, BUN 65,

creatinine 1.21, hemoglobin is up to 13.9, WBC count 10.73.

 

ASSESSMENT AND PLAN:  A 58-year-old female who presented with symptomatic

congestive heart failure including right-sided heart failure, diastolic heart

failure, chronic kidney disease stage III.

1.  Acute kidney injury.  Renal function has actually improved with ongoing

diuresis, which is actually a very good sign.  She did have some chronic

kidney disease prior to this hospitalization, although we do not have the

exact lab result, but at this time creatinine is already down to 1.21 and

improving.  We will continue with the current dose of IV Lasix as it seems to

be working.  Continue Lasix 80 mg IV twice daily and metolazone 5 mg daily. 

If the serum sodium drops too low, we can stop the metolazone, but will

continue with IV Lasix.  She gained about 70 pounds in the last 2 months, so

we still have good way to go before we get her to her previous weight.

 

 

 

 

[facility]

## 2018-08-01 NOTE — PROGRESS NOTE
Medicine Progress Note


Date & Time of Visit:


Aug 1, 2018 at 18:05


.


Subjective





CC: 


Follow-up visit for respiratory failure and other problems.





HPI: 


Better.


No fever.


Nonproductive cough.


Dyspnea with exertion, but not at rest.





ROS:


General- no fever, no chills


Resp- as noted above in HPI


Cardiac-  no chest pain, no edema


GI- no nausea, no vomiting, no diarrhea


- Portillo cath removed; voiding without difficulty


.





Objective





Last 8 Hrs








  Date Time  Temp Pulse Resp B/P (MAP) Pulse Ox O2 Delivery O2 Flow Rate FiO2


 


8/1/18 19:26 36.7 71 18 147/88 (107) 98 Room Air  


 


8/1/18 15:39 36.9 78 18 104/69 (81) 96 Room Air  


 


8/1/18 15:24  78 16  88 Nasal Cannula 3.0 








Physical Exam:





General- sitting in chair; no acute distress


Lungs- few bibasilar rales, diffuse mild wheezing


Cardiovascular- distant heart sounds; RRR; no murmur or gallop appreciated; 

neck veins difficulty to assess; 1-2+ pretibial edema


Abdomen- obese, + bowel sounds, soft, nontender 


Extremities- no calf tenderness 


Neuro- alert, oriented


Skin- warm & dry


.


Laboratory Results:





Last 24 Hours








Test


  8/1/18


05:20 8/1/18


07:02 8/1/18


07:25 8/1/18


11:10


 


White Blood Count 10.73 K/uL    


 


Red Blood Count 4.66 M/uL    


 


Hemoglobin 13.9 g/dL    


 


Hematocrit 44.2 %    


 


Mean Corpuscular Volume 94.8 fL    


 


Mean Corpuscular Hemoglobin 29.8 pg    


 


Mean Corpuscular Hemoglobin


Concent 31.4 g/dl 


  


  


  


 


 


Platelet Count 231 K/uL    


 


Mean Platelet Volume 10.0 fL    


 


Neutrophils (%) (Auto) 73.0 %    


 


Lymphocytes (%) (Auto) 19.1 %    


 


Monocytes (%) (Auto) 7.2 %    


 


Eosinophils (%) (Auto) 0.3 %    


 


Basophils (%) (Auto) 0.1 %    


 


Neutrophils # (Auto) 7.84 K/uL    


 


Lymphocytes # (Auto) 2.05 K/uL    


 


Monocytes # (Auto) 0.77 K/uL    


 


Eosinophils # (Auto) 0.03 K/uL    


 


Basophils # (Auto) 0.01 K/uL    


 


RDW Standard Deviation 56.4 fL    


 


RDW Coefficient of Variation 16.4 %    


 


Immature Granulocyte % (Auto) 0.3 %    


 


Immature Granulocyte # (Auto) 0.03 K/uL    


 


Prothrombin Time 11.8 SECONDS    


 


Prothromb Time International


Ratio 1.1 


  


  


  


 


 


Activated Partial


Thromboplast Time 66.3 SECONDS 


  


  


  


 


 


Partial Thromboplastin Ratio 2.6    


 


Sodium Level 134 mmol/L    


 


Potassium Level  mmol/L  4.7 mmol/L   


 


Chloride Level 99 mmol/L    


 


Carbon Dioxide Level 29 mmol/L    


 


Anion Gap 6.0 mmol/L    


 


Blood Urea Nitrogen 65 mg/dl    


 


Creatinine 1.21 mg/dl    


 


Est Creatinine Clear Calc


Drug Dose 83.1 ml/min 


  


  


  


 


 


Estimated GFR (


American) 57.9 


  


  


  


 


 


Estimated GFR (Non-


American 50.0 


  


  


  


 


 


BUN/Creatinine Ratio 53.6    


 


Random Glucose 127 mg/dl    


 


Calcium Level 9.0 mg/dl    


 


Magnesium Level  mg/dl  1.8 mg/dl   


 


Bedside Glucose   119 mg/dl  171 mg/dl 


 


Test


  8/1/18


16:42 8/1/18


20:11 8/1/18


20:12 


 


 


Bedside Glucose 299 mg/dl  391 mg/dl  368 mg/dl  











Assessment & Plan





ACUTE ON CHRONIC RESPIRATORY FAILURE


Acute hypoxic and hypercapnic respiratory failure.


Suspect some degree of chronicity.


Hypoxia could have been secondary to CHF and COPD.


Considered pulmonary embolism.


Did not pursue CT of chest due to renal insufficiency.


Venous duplex lower extremities negative for DVT.


Hypercapnia probably secondary to combination of COPD, obesity hypoventilation 

syndrome.


Specific problems addressed below.


BiPAP utilized for ventilatory support.


Respiratory status improved.





SLEEP APNEA


Previously diagnosed.


Continue BiPAP when sleeping.


Encourage use of CPAP at home.





ACUTE COPD EXACERBATION


Baseline PFT's not available.


Continue steroids, bronchodilators, levofloxacin.





PULMONARY HYPERTENSION


Estimated PA pressure in 70's per echo.


RV dilated with decreased systolic function.


Pulmonary hypertension could be secondary to COPD, sleep apnea, obesity 

hypoventilation syndrome, or possible chronic thromboembolic disease.


Patient does not wish to consider right-sided cath at this time.


Optimize pulmonary issues as noted above.


Empiric coagulation for possible chronic thromboembolic disease.





CHF


Presented with weight gain and shortness of breath.


Chest x-ray demonstrated cardiomegaly and pulmonary vascular congestion.


BNP was elevated.


Echo demonstrated grossly normal LV systolic function; dilated RV with 

decreased systolic function.


Suspect acute on chronic left ventricular diastolic heart failure.


Suspect acute on chronic right ventricular heart failure secondary to 

underlying pulmonary diseases.


Old records from [place] requested for baseline data.


Continue diuretics.





ABNORMAL EKG


EKG demonstrated nonspecific changes suggesting ischemia.


Cardiology consulted.


Serum troponins negative x 3.


No apparent left ventricular segmental wall motion abnormalities on echo.





HYPERTENSION


Lisinopril stopped due to hyperkalemia.


Follow and titrate therapy.





RENAL INSUFFICIENCY


Serum creatinine at time of admission was 1.84.


Baseline creatinine unknown.


Nephrology consulted.


Uses ibuprofen PRN-discontinued.


Creatinine this morning = 1.21.





HYPERKALEMIA


Serum potassium 5.9 at time of admission and jimmy as high as 6.0.


Hyperkalemia probably multifactorial- ACE inhibitor, respiratory acidosis, 

renal insufficiency.


ACE inhibitor discontinued.


K this morning = 4.7.


Follow.





DM TYPE 2


History of diabetes mellitus type 2, usually managed with metformin, glimepiride

, sitagliptin.


Random glucose in ED 99.


Hemoglobin A1c 9.9.


Hold oral agents during hospital stay.


Pharmacy consulted for glycemic management.


Novolin N/NovoLog per protocol.


FBS this morning = 119.





MORBID OBESITY


Wt 162 kg, BMI 55.9.


AHA, diabetic diet.





GENERAL DEBILITATION


PT / OT.





INCOMPLETE DATA


Records from [place] requested.





VTE PROPHYLAXIS / HISTORY OF DVT


Initially received SQ heparin.


Started on IV heparin for possible thromboembolic disease.


At long-term risk for recurrent DVT.


Transition from IV heparin to SQ enoxaparin.


DOAC's probably not appropriate because of weight and renal insufficiency; 

warfarin preferred oral anticoagulant.


Patient prefers not to take warfarin.





DISPOSITION


Discharge disposition to be determined.


.


Current Inpatient Medications:





Current Inpatient Medications








 Medications


  (Trade)  Dose


 Ordered  Sig/Vivi


 Route  Start Time


 Stop Time Status Last Admin


Dose Admin


 


 Acetaminophen


  (Tylenol Tab)  650 mg  Q4H  PRN


 PO  7/29/18 23:15


 8/28/18 23:14   


 


 


 Al Hydrox/Mg


 Hydrox/Simethicone


  (Maalox Max Susp)  15 ml  Q4H  PRN


 PO  7/29/18 23:15


 8/28/18 23:14   


 


 


 Ondansetron HCl


  (Zofran Inj)  4 mg  Q6H  PRN


 IV  7/29/18 23:15


 8/28/18 23:14   


 


 


 Nitroglycerin


  (Nitrostat Tab)  0.4 mg  UD  PRN


 SL  7/29/18 23:15


 8/28/18 23:14   


 


 


 Polyethylene


  (Miralax Powder


 Packet)  17 gm  DAILY  PRN


 PO  7/29/18 23:15


 8/28/18 23:14   


 


 


 Gabapentin


  (Neurontin Tab)  800 mg  BID  PRN


 PO  7/29/18 23:15


 8/28/18 23:14 Future Hold  


 


 


 Oxycodone/


 Acetaminophen


  (Percocet


 7.5-325MG Tab)  1 tab  Q8  PRN


 PO  7/29/18 23:15


 8/12/18 23:14  8/1/18 08:13


1 TAB


 


 Miscellaneous


 Information


  (Order Awaiting


 Action)  1 ea  QS


 N/A  7/30/18 08:00


 8/29/18 07:59   


 


 


 Miscellaneous


 Information


  (Order Awaiting


 Action)  1 ea  QS


 N/A  7/30/18 08:00


 8/29/18 07:59   


 


 


 Albuterol/


 Ipratropium


  (Duoneb)  3 ml  QIDR


 INH  7/30/18 08:00


 8/29/18 07:59  8/1/18 15:24


3 ML


 


 Prednisone


  (PredniSONE TAB)  40 mg  DAILY


 PO  7/30/18 09:00


 8/29/18 08:59  8/1/18 09:28


40 MG


 


 Insulin Aspart


  (novoLOG ASPART)  **SLIDING


 SCALE**


 **G...  ACHS


 SC  7/30/18 07:00


 8/29/18 06:59  8/1/18 20:35


9 UNITS


 


 Hydralazine HCl


  (Apresoline Tab)  10 mg  Q6  PRN


 PO  7/29/18 23:15


 8/28/18 23:14  7/30/18 08:08


10 MG


 


 Glucose


  (Glucose 40% Gel)  15-30


 GRAMS 15


 GRAMS...  UD  PRN


 PO  7/29/18 23:45


 8/28/18 23:44   


 


 


 Glucose


  (Glucose Chew


 Tab)  4-8


 Tablets 4


 Tabl...  UD  PRN


 PO  7/29/18 23:45


 8/28/18 23:44   


 


 


 Dextrose


  (Dextrose 50%


 50ML Syringe)  25-50ML


 25ML FOR


 ...  UD  PRN


 IV  7/29/18 23:45


 8/28/18 23:44   


 


 


 Glucagon


  (Glucagon Inj)  1 mg  UD  PRN


 IM  7/29/18 23:45


 8/28/18 23:44   


 


 


 Carbohydrates


  (Carbohydrates


 For Hypoglycemia)  15-30 GRAMS


 15 grams if


 BSG 54-69...  UD  PRN


 PO  7/29/18 23:45


 8/28/18 23:44   


 


 


 Levofloxacin


  (Consult)  1 ea  UD  PRN


 N/A  7/30/18 01:15


 8/29/18 01:14   


 


 


 Nystatin


  (Mycostatin Oint)  1 appln  BID


 EXT  7/30/18 09:00


 8/29/18 08:59  8/1/18 11:40


1 APPLN


 


 Heparin Sodium/


 Dextrose  500 ml @ 


 33 mls/hr  [id number]


 IV  7/30/18 18:45


 8/29/18 18:44  8/1/18 16:47


33 MLS/HR


 


 Miscellaneous


 Information


  (Consult


 Glycemic


 Management


 Pharmacy)  1 ea  UD  PRN


 N/A  7/31/18 08:58


 8/30/18 08:57   


 


 


 Levofloxacin


  (Levaquin Tab)  750 mg  DAILY@11


 PO  8/1/18 11:00


 8/3/18 11:01  8/1/18 12:17


750 MG


 


 Metolazone


  (Zaroxolyn Tab)  5 mg  QAM


 PO  7/31/18 15:00


 8/30/18 14:59  8/1/18 08:12


5 MG


 


 Furosemide 80 mg/


 Syringe  8 ml @ 4


 mls/min  [id number]


 IV  8/1/18 07:00


 8/31/18 06:59  8/1/18 14:04


4 MLS/MIN


 


 Insulin Human NPH


  (novoLIN-N NPH)  55 units  QDB


 SC  8/1/18 07:30


 8/31/18 07:29  8/1/18 08:11


55 UNITS


 


 Insulin Aspart


  (novoLOG ASPART)  **SLIDING


 SCALE**


 **G...  0000,0400


 SC  8/2/18 00:00


 8/2/18 04:01

## 2018-08-01 NOTE — PHARMACY PROGRESS NOTE
Pharmacy Glycemic Short Note 2


Date of Service


Aug 1, 2018.





OUTPATIENT ANTIDIABETIC REGIMEN: 


* Metformin 850 mg po TID


* Glimepiride 4 mg po BID


* Sitagliptin 100 mg po daily


* A1c = 9.9 % on 7/30/18














Test


  7/31/18


11:14 7/31/18


15:55 7/31/18


20:29 8/1/18


05:20


 


Bedside Glucose


  105 mg/dl


(70-90) 274 mg/dl


(70-90) 242 mg/dl


(70-90) 


 


 


Random Glucose


  


  


  


  127 mg/dl


(70-99)


 


Test


  8/1/18


07:25 


  


  


 


 


Bedside Glucose


  119 mg/dl


(70-90) 


  


  


 











ASSESSMENT:





8/1/18


* Ms. De received 40 units of insulin yesterday


* Fasting BSG WNL but postprandial BSGs are elevated


* She remains on prednisone 40 mg daily.  Will transition to NPH insulin only 

to mimic the PK of the once daily prednisone.  Typically 0.4 units/kg is given 

for prednisone >=40 mg, which would be 64 units.  Will start with 55 units 

since she received 40 units of total insulin yesterday and BSGs were still 

elevated. Will continue correctional insulin in addition to this in case the 

NPH is not enough.





7/31/18


* 57 yo F with acute on chronic respiratory and heart failures currently in ICU


* Patient's HbA1c is not well controlled on 3 oral agents at home - would 

benefit from initiation of insulin as outpatient


* Inpatient BSG's not well controlled (all >200 mg/dL yesterday) - likely 2nd 

steroid-induced hyperglycemia and lower doses of insulin administered (for 

patient's weight)


* AM fasting BSG in range this AM despite lower dose of Lantus yesterday (10 

units administered) - patient would benefit from heavier basal load during day 

to help with post-prandial elevations, but decreased doses overnight to help 

prevent overnight/AM hypoglycemia as steroid effects dissipate.  Will therefore 

switch from Lantus to NPH


* OK to tighten Novolog parameters as all BSG's elevated yesterday despite NPO 

status - anticipate steroid effects to be more pronounced now that diet is 

resumed











PLAN FOR INPATIENT GLYCEMIC CONTROL:


* Continue to hold outpatient oral diabetes medications





* Basal/prandial insulin


 * NPH 55 units (0.34 units/kg) qAM w/ prednisone dose 





* Bolus insulin


 * NovoLog per scale ACHS or Q6hrs while NPO


 * Goal Range:  Low 120 mg/dL - High 150 mg/dL


 * Correction Factor:  25 mg/dL/unit








PLAN FOR DISCHARGE:


* A1c elevated at 9.9%.  Patient agreeable to adding once daily insulin to 

regimen.


* Would recommend:


* Lantus (or Basaglar) 30 units qHS


* D/C glimepiride


* Continue metformin and sitagliptin

## 2018-08-02 VITALS
SYSTOLIC BLOOD PRESSURE: 120 MMHG | OXYGEN SATURATION: 94 % | TEMPERATURE: 97.52 F | HEART RATE: 81 BPM | DIASTOLIC BLOOD PRESSURE: 72 MMHG

## 2018-08-02 VITALS
OXYGEN SATURATION: 93 % | HEART RATE: 78 BPM | TEMPERATURE: 97.7 F | DIASTOLIC BLOOD PRESSURE: 75 MMHG | SYSTOLIC BLOOD PRESSURE: 129 MMHG

## 2018-08-02 VITALS
TEMPERATURE: 97.88 F | SYSTOLIC BLOOD PRESSURE: 126 MMHG | HEART RATE: 82 BPM | OXYGEN SATURATION: 92 % | DIASTOLIC BLOOD PRESSURE: 77 MMHG

## 2018-08-02 VITALS
HEART RATE: 78 BPM | SYSTOLIC BLOOD PRESSURE: 127 MMHG | OXYGEN SATURATION: 91 % | TEMPERATURE: 97.34 F | DIASTOLIC BLOOD PRESSURE: 77 MMHG

## 2018-08-02 VITALS
TEMPERATURE: 97.7 F | DIASTOLIC BLOOD PRESSURE: 78 MMHG | OXYGEN SATURATION: 92 % | SYSTOLIC BLOOD PRESSURE: 113 MMHG | HEART RATE: 74 BPM

## 2018-08-02 VITALS — OXYGEN SATURATION: 98 % | HEART RATE: 78 BPM

## 2018-08-02 VITALS — SYSTOLIC BLOOD PRESSURE: 147 MMHG | HEART RATE: 78 BPM | OXYGEN SATURATION: 93 % | DIASTOLIC BLOOD PRESSURE: 78 MMHG

## 2018-08-02 VITALS — HEART RATE: 78 BPM | OXYGEN SATURATION: 93 %

## 2018-08-02 VITALS — HEART RATE: 78 BPM | OXYGEN SATURATION: 98 %

## 2018-08-02 VITALS — HEART RATE: 92 BPM | OXYGEN SATURATION: 93 %

## 2018-08-02 VITALS
DIASTOLIC BLOOD PRESSURE: 82 MMHG | HEART RATE: 77 BPM | SYSTOLIC BLOOD PRESSURE: 139 MMHG | TEMPERATURE: 97.88 F | OXYGEN SATURATION: 92 %

## 2018-08-02 VITALS — OXYGEN SATURATION: 92 %

## 2018-08-02 VITALS — OXYGEN SATURATION: 93 %

## 2018-08-02 LAB
BASOPHILS # BLD: 0 K/UL (ref 0–0.2)
BASOPHILS NFR BLD: 0 %
BUN SERPL-MCNC: 56 MG/DL (ref 7–18)
CALCIUM SERPL-MCNC: 8.9 MG/DL (ref 8.5–10.1)
CO2 SERPL-SCNC: 36 MMOL/L (ref 21–32)
CREAT SERPL-MCNC: 1.39 MG/DL (ref 0.6–1.2)
EOS ABS #: 0 K/UL (ref 0–0.5)
EOSINOPHIL NFR BLD AUTO: 240 K/UL (ref 130–400)
GLUCOSE SERPL-MCNC: 226 MG/DL (ref 70–99)
HCT VFR BLD CALC: 45.3 % (ref 37–47)
HGB BLD-MCNC: 14.5 G/DL (ref 12–16)
IG#: 0.04 K/UL (ref 0–0.02)
IMM GRANULOCYTES NFR BLD AUTO: 14 %
LYMPHOCYTES # BLD: 1.28 K/UL (ref 1.2–3.4)
MCH RBC QN AUTO: 30 PG (ref 25–34)
MCHC RBC AUTO-ENTMCNC: 32 G/DL (ref 32–36)
MCV RBC AUTO: 93.6 FL (ref 80–100)
MONO ABS #: 0.68 K/UL (ref 0.11–0.59)
MONOCYTES NFR BLD: 7.4 %
NEUT ABS #: 7.17 K/UL (ref 1.4–6.5)
NEUTROPHILS # BLD AUTO: 0 %
NEUTROPHILS NFR BLD AUTO: 78.2 %
PMV BLD AUTO: 9.8 FL (ref 7.4–10.4)
POTASSIUM SERPL-SCNC: 5 MMOL/L (ref 3.5–5.1)
PTT PATIENT: 61.3 SECONDS (ref 21–31)
RED CELL DISTRIBUTION WIDTH CV: 16 % (ref 11.5–14.5)
RED CELL DISTRIBUTION WIDTH SD: 54.9 FL (ref 36.4–46.3)
SODIUM SERPL-SCNC: 133 MMOL/L (ref 136–145)
WBC # BLD AUTO: 9.17 K/UL (ref 4.8–10.8)

## 2018-08-02 RX ADMIN — FUROSEMIDE SCH MLS/MIN: 10 INJECTION, SOLUTION INTRAMUSCULAR; INTRAVENOUS at 14:09

## 2018-08-02 RX ADMIN — LEVALBUTEROL SCH MG: 1.25 SOLUTION, CONCENTRATE RESPIRATORY (INHALATION) at 01:45

## 2018-08-02 RX ADMIN — ENOXAPARIN SODIUM SCH MG: 150 INJECTION SUBCUTANEOUS at 08:51

## 2018-08-02 RX ADMIN — HEPARIN SODIUM SCH MLS/HR: 5000 INJECTION, SOLUTION INTRAVENOUS at 08:45

## 2018-08-02 RX ADMIN — INSULIN ASPART SCH UNITS: 100 INJECTION, SOLUTION INTRAVENOUS; SUBCUTANEOUS at 12:00

## 2018-08-02 RX ADMIN — LEVOFLOXACIN SCH MG: 750 TABLET, FILM COATED ORAL at 11:16

## 2018-08-02 RX ADMIN — INSULIN ASPART SCH UNITS: 100 INJECTION, SOLUTION INTRAVENOUS; SUBCUTANEOUS at 17:00

## 2018-08-02 RX ADMIN — ALUMINUM ZIRCONIUM TRICHLOROHYDREX GLY SCH EA: 0.2 STICK TOPICAL at 00:00

## 2018-08-02 RX ADMIN — IPRATROPIUM BROMIDE SCH MG: 0.5 SOLUTION RESPIRATORY (INHALATION) at 14:29

## 2018-08-02 RX ADMIN — INSULIN ASPART SCH UNITS: 100 INJECTION, SOLUTION INTRAVENOUS; SUBCUTANEOUS at 07:44

## 2018-08-02 RX ADMIN — FUROSEMIDE SCH MLS/MIN: 10 INJECTION, SOLUTION INTRAMUSCULAR; INTRAVENOUS at 08:48

## 2018-08-02 RX ADMIN — OXYCODONE AND ACETAMINOPHEN PRN TAB: 7.5; 325 TABLET ORAL at 11:19

## 2018-08-02 RX ADMIN — IPRATROPIUM BROMIDE SCH MG: 0.5 SOLUTION RESPIRATORY (INHALATION) at 01:45

## 2018-08-02 RX ADMIN — IPRATROPIUM BROMIDE SCH MG: 0.5 SOLUTION RESPIRATORY (INHALATION) at 19:06

## 2018-08-02 RX ADMIN — LEVALBUTEROL SCH MG: 1.25 SOLUTION, CONCENTRATE RESPIRATORY (INHALATION) at 14:29

## 2018-08-02 RX ADMIN — INSULIN ASPART SCH UNITS: 100 INJECTION, SOLUTION INTRAVENOUS; SUBCUTANEOUS at 00:12

## 2018-08-02 RX ADMIN — OXYCODONE AND ACETAMINOPHEN PRN TAB: 7.5; 325 TABLET ORAL at 23:35

## 2018-08-02 RX ADMIN — HUMAN INSULIN SCH UNITS: 100 INJECTION, SUSPENSION SUBCUTANEOUS at 08:50

## 2018-08-02 RX ADMIN — METOLAZONE SCH MG: 5 TABLET ORAL at 08:47

## 2018-08-02 RX ADMIN — LEVALBUTEROL SCH MG: 1.25 SOLUTION, CONCENTRATE RESPIRATORY (INHALATION) at 07:01

## 2018-08-02 RX ADMIN — NYSTATIN SCH APPLN: 100000 OINTMENT TOPICAL at 08:48

## 2018-08-02 RX ADMIN — ALUMINUM ZIRCONIUM TRICHLOROHYDREX GLY SCH EA: 0.2 STICK TOPICAL at 07:45

## 2018-08-02 RX ADMIN — ALUMINUM ZIRCONIUM TRICHLOROHYDREX GLY SCH EA: 0.2 STICK TOPICAL at 21:01

## 2018-08-02 RX ADMIN — INSULIN ASPART SCH UNITS: 100 INJECTION, SOLUTION INTRAVENOUS; SUBCUTANEOUS at 05:04

## 2018-08-02 RX ADMIN — INSULIN ASPART SCH UNITS: 100 INJECTION, SOLUTION INTRAVENOUS; SUBCUTANEOUS at 20:58

## 2018-08-02 RX ADMIN — IPRATROPIUM BROMIDE SCH MG: 0.5 SOLUTION RESPIRATORY (INHALATION) at 07:01

## 2018-08-02 RX ADMIN — NYSTATIN SCH APPLN: 100000 OINTMENT TOPICAL at 20:53

## 2018-08-02 RX ADMIN — LEVALBUTEROL SCH MG: 1.25 SOLUTION, CONCENTRATE RESPIRATORY (INHALATION) at 19:06

## 2018-08-02 RX ADMIN — ALUMINUM ZIRCONIUM TRICHLOROHYDREX GLY SCH EA: 0.2 STICK TOPICAL at 15:33

## 2018-08-02 NOTE — PROGRESS NOTE
Medicine Progress Note


Date & Time of Visit:


Aug 2, 2018 at 13:10


.


Subjective





CC: 


Follow-up visit for respiratory failure and other problems.





HPI: 


No fever.


Nonproductive cough.


Dyspnea with exertion.


Run of narrow-complex tachycardia last night.


No chest pain.


Persistent edema.





ROS:


General- no fever, no chills


Resp- as noted above in HPI


Cardiac-  as noted above in HPI


GI- no nausea, no vomiting, no diarrhea


- Portillo cath removed; voiding without difficulty


.





Objective





Last 8 Hrs








  Date Time  Temp Pulse Resp B/P (MAP) Pulse Ox O2 Delivery O2 Flow Rate FiO2


 


8/2/18 23:08 36.4 81 17 120/72 (88) 94 Room Air  


 


8/2/18 19:08  78 16  93 Nasal Cannula 2.0 


 


8/2/18 19:04 36.5 78 18 129/75 (93) 93 Nasal Cannula  


 


8/2/18 16:00     92 Nasal Cannula 2.0 30





      BiPAP  








Physical Exam:





General- sitting in chair; no acute distress


Lungs- few bibasilar rales, diffuse mild wheezing


Cardiovascular- distant heart sounds; RRR; no murmur or gallop appreciated; 

neck veins difficulty to assess; 2+ pretibial edema


Abdomen- obese, + bowel sounds, soft, nontender 


Extremities- no calf tenderness 


Neuro- alert, oriented


Skin- warm & dry


.


Laboratory Results:





Last 24 Hours








Test


  8/2/18


00:02 8/2/18


00:38 8/2/18


04:29 8/2/18


07:24


 


Bedside Glucose 258 mg/dl   152 mg/dl  110 mg/dl 


 


White Blood Count  9.17 K/uL   


 


Red Blood Count  4.84 M/uL   


 


Hemoglobin  14.5 g/dL   


 


Hematocrit  45.3 %   


 


Mean Corpuscular Volume  93.6 fL   


 


Mean Corpuscular Hemoglobin  30.0 pg   


 


Mean Corpuscular Hemoglobin


Concent 


  32.0 g/dl 


  


  


 


 


Platelet Count  240 K/uL   


 


Mean Platelet Volume  9.8 fL   


 


Neutrophils (%) (Auto)  78.2 %   


 


Lymphocytes (%) (Auto)  14.0 %   


 


Monocytes (%) (Auto)  7.4 %   


 


Eosinophils (%) (Auto)  0.0 %   


 


Basophils (%) (Auto)  0.0 %   


 


Neutrophils # (Auto)  7.17 K/uL   


 


Lymphocytes # (Auto)  1.28 K/uL   


 


Monocytes # (Auto)  0.68 K/uL   


 


Eosinophils # (Auto)  0.00 K/uL   


 


Basophils # (Auto)  0.00 K/uL   


 


RDW Standard Deviation  54.9 fL   


 


RDW Coefficient of Variation  16.0 %   


 


Immature Granulocyte % (Auto)  0.4 %   


 


Immature Granulocyte # (Auto)  0.04 K/uL   


 


Activated Partial


Thromboplast Time 


  61.3 SECONDS 


  


  


 


 


Partial Thromboplastin Ratio  2.4   


 


Sodium Level  133 mmol/L   


 


Potassium Level  5.0 mmol/L   


 


Chloride Level  92 mmol/L   


 


Carbon Dioxide Level  36 mmol/L   


 


Anion Gap  5.0 mmol/L   


 


Blood Urea Nitrogen  56 mg/dl   


 


Creatinine  1.39 mg/dl   


 


Est Creatinine Clear Calc


Drug Dose 


  72.3 ml/min 


  


  


 


 


Estimated GFR (


American) 


  49.0 


  


  


 


 


Estimated GFR (Non-


American 


  42.3 


  


  


 


 


BUN/Creatinine Ratio  40.4   


 


Random Glucose  226 mg/dl   


 


Calcium Level  8.9 mg/dl   


 


Magnesium Level  1.7 mg/dl   


 


Test


  8/2/18


11:01 8/2/18


16:15 8/2/18


20:26 


 


 


Bedside Glucose 179 mg/dl  375 mg/dl  279 mg/dl  











Assessment & Plan





ACUTE ON CHRONIC RESPIRATORY FAILURE


Acute hypoxic and hypercapnic respiratory failure.


Suspect some degree of chronicity.


Hypoxia could have been secondary to CHF and COPD.


Considered pulmonary embolism.


Did not pursue CT of chest due to renal insufficiency.


Venous duplex lower extremities negative for DVT.


Hypercapnia probably secondary to combination of COPD, obesity hypoventilation 

syndrome.


Specific problems addressed below.


BiPAP utilized for ventilatory support.


Respiratory status improved.





SLEEP APNEA


Previously diagnosed.


Continue BiPAP when sleeping.


Encourage use of CPAP at home.





ACUTE COPD EXACERBATION


Baseline PFT's not available.


Continue steroids, bronchodilators, levofloxacin.





PULMONARY HYPERTENSION


Estimated PA pressure in 70's per echo.


RV dilated with decreased systolic function.


Pulmonary hypertension could be secondary to COPD, sleep apnea, obesity 

hypoventilation syndrome, or possible chronic thromboembolic disease.


Patient does not wish to consider right-sided cath at this time.


Optimize pulmonary issues as noted above.


Empiric coagulation for possible chronic thromboembolic disease.





CHF


Presented with weight gain and shortness of breath.


Chest x-ray demonstrated cardiomegaly and pulmonary vascular congestion.


BNP was elevated.


Echo demonstrated grossly normal LV systolic function; dilated RV with 

decreased systolic function.


Suspect acute on chronic left ventricular diastolic heart failure.


Suspect acute on chronic right ventricular heart failure secondary to 

underlying pulmonary diseases.


Old records from [place] requested for baseline data.


Continue furosemide 80 mg IV BID.





ABNORMAL EKG


EKG demonstrated nonspecific changes suggesting ischemia.


Cardiology consulted.


Serum troponins negative x 3.


No apparent left ventricular segmental wall motion abnormalities on echo.





HYPERTENSION


Lisinopril stopped due to hyperkalemia.


Follow and titrate therapy.





RENAL INSUFFICIENCY


Serum creatinine at time of admission was 1.84.


Baseline creatinine unknown.


Nephrology consulted.


Uses ibuprofen PRN-discontinued.


Creatinine this morning = 1.39.





HYPERKALEMIA


Serum potassium 5.9 at time of admission and jimmy as high as 6.0.


Hyperkalemia probably multifactorial- ACE inhibitor, respiratory acidosis, 

renal insufficiency.


ACE inhibitor discontinued.


K this morning = 5.0.


Follow.





DM TYPE 2


History of diabetes mellitus type 2, usually managed with metformin, glimepiride

, sitagliptin.


Random glucose in ED 99.


Hemoglobin A1c 9.9.


Hold oral agents during hospital stay.


Pharmacy consulted for glycemic management.


Novolin N/NovoLog per protocol.


FBS this morning = 152.





MORBID OBESITY


Wt 162 kg, BMI 55.9.


AHA, diabetic diet.





GENERAL DEBILITATION


PT / OT.





INCOMPLETE DATA


Records from [place] requested.





VTE PROPHYLAXIS / HISTORY OF DVT


Initially received SQ heparin.


Started on IV heparin for possible thromboembolic disease.


At long-term risk for recurrent DVT.


Transition from IV heparin to SQ enoxaparin.


DOAC's probably not appropriate because of weight and renal insufficiency; 

warfarin preferred oral anticoagulant.


Patient prefers not to take warfarin.





DISPOSITION


Discharge disposition to be determined.


.


Current Inpatient Medications:





Current Inpatient Medications








 Medications


  (Trade)  Dose


 Ordered  Sig/Vivi


 Route  Start Time


 Stop Time Status Last Admin


Dose Admin


 


 Acetaminophen


  (Tylenol Tab)  650 mg  Q4H  PRN


 PO  7/29/18 23:15


 8/28/18 23:14   


 


 


 Al Hydrox/Mg


 Hydrox/Simethicone


  (Maalox Max Susp)  15 ml  Q4H  PRN


 PO  7/29/18 23:15


 8/28/18 23:14   


 


 


 Ondansetron HCl


  (Zofran Inj)  4 mg  Q6H  PRN


 IV  7/29/18 23:15


 8/28/18 23:14   


 


 


 Nitroglycerin


  (Nitrostat Tab)  0.4 mg  UD  PRN


 SL  7/29/18 23:15


 8/28/18 23:14   


 


 


 Polyethylene


  (Miralax Powder


 Packet)  17 gm  DAILY  PRN


 PO  7/29/18 23:15


 8/28/18 23:14   


 


 


 Gabapentin


  (Neurontin Tab)  800 mg  BID  PRN


 PO  7/29/18 23:15


 8/28/18 23:14 Future Hold  


 


 


 Oxycodone/


 Acetaminophen


  (Percocet


 7.5-325MG Tab)  1 tab  Q8  PRN


 PO  7/29/18 23:15


 8/12/18 23:14  8/2/18 23:35


1 TAB


 


 Miscellaneous


 Information


  (Order Awaiting


 Action)  1 ea  QS


 N/A  7/30/18 08:00


 8/29/18 07:59   


 


 


 Prednisone


  (PredniSONE TAB)  40 mg  DAILY


 PO  7/30/18 09:00


 8/29/18 08:59  8/2/18 08:47


40 MG


 


 Insulin Aspart


  (novoLOG ASPART)  **SLIDING


 SCALE**


 **G...  ACHS


 SC  7/30/18 07:00


 8/29/18 06:59  8/2/18 20:58


12 UNITS


 


 Hydralazine HCl


  (Apresoline Tab)  10 mg  Q6  PRN


 PO  7/29/18 23:15


 8/28/18 23:14  7/30/18 08:08


10 MG


 


 Glucose


  (Glucose 40% Gel)  15-30


 GRAMS 15


 GRAMS...  UD  PRN


 PO  7/29/18 23:45


 8/28/18 23:44   


 


 


 Glucose


  (Glucose Chew


 Tab)  4-8


 Tablets 4


 Tabl...  UD  PRN


 PO  7/29/18 23:45


 8/28/18 23:44   


 


 


 Dextrose


  (Dextrose 50%


 50ML Syringe)  25-50ML


 25ML FOR


 ...  UD  PRN


 IV  7/29/18 23:45


 8/28/18 23:44   


 


 


 Glucagon


  (Glucagon Inj)  1 mg  UD  PRN


 IM  7/29/18 23:45


 8/28/18 23:44   


 


 


 Carbohydrates


  (Carbohydrates


 For Hypoglycemia)  15-30 GRAMS


 15 grams if


 BSG 54-69...  UD  PRN


 PO  7/29/18 23:45


 8/28/18 23:44   


 


 


 Levofloxacin


  (Consult)  1 ea  UD  PRN


 N/A  7/30/18 01:15


 8/29/18 01:14   


 


 


 Nystatin


  (Mycostatin Oint)  1 appln  BID


 EXT  7/30/18 09:00


 8/29/18 08:59  8/2/18 20:53


1 APPLN


 


 Miscellaneous


 Information


  (Consult


 Glycemic


 Management


 Pharmacy)  1 ea  UD  PRN


 N/A  7/31/18 08:58


 8/30/18 08:57   


 


 


 Levofloxacin


  (Levaquin Tab)  750 mg  DAILY@11


 PO  8/1/18 11:00


 8/3/18 11:01  8/2/18 11:16


750 MG


 


 Metolazone


  (Zaroxolyn Tab)  5 mg  QAM


 PO  7/31/18 15:00


 8/30/18 14:59  8/2/18 08:47


5 MG


 


 Furosemide 80 mg/


 Syringe  8 ml @ 4


 mls/min  [id number]


 IV  8/1/18 07:00


 8/31/18 06:59  8/2/18 14:09


4 MLS/MIN


 


 Ipratropium


 Bromide


  (Atrovent 0.02%


 0.5MG/2.5ML Neb)  0.5 mg  Q6R


 INH  8/2/18 03:00


 9/1/18 02:59  8/2/18 19:06


0.5 MG


 


 Levalbuterol


  (Xopenex 1.25MG/


 0.5ML Neb)  1.25 mg  Q6R


 INH  8/2/18 03:00


 9/1/18 02:59  8/2/18 19:06


1.25 MG


 


 Ipratropium


 Bromide


  (Atrovent 0.02%


 0.5MG/2.5ML Neb)  0.5 mg  Q4H  PRN


 INH  8/2/18 01:45


 9/1/18 01:44   


 


 


 Levalbuterol


  (Xopenex 1.25MG/


 0.5ML Neb)  1.25 mg  Q4H  PRN


 INH  8/2/18 01:45


 9/1/18 01:44   


 


 


 Enoxaparin Sodium


  (Lovenox Inj)  150 mg  DAILY@0900


 SQ  8/2/18 09:00


 9/1/18 08:59  8/2/18 08:51


150 MG


 


 Insulin Human NPH


  (novoLIN-N NPH)  70 units  QDB


 SC  8/2/18 07:30


 9/1/18 07:29  8/2/18 08:50


70 UNITS


 


 Salmeterol


 Xinafoate/


 Fluticasone


  (Advair Hfa 230/


 21 Inh)  2 puff  BID


 INH  8/3/18 09:00


 9/2/18 08:59

## 2018-08-02 NOTE — CARDIOLOGY FOLLOW-UP
Subjective


General


Date of Service:


Aug 2, 2018.


Chief Complaint:  resp failure; right heart failure


Pt evaluation today including:  conversation w/ patient, physical exam, chart 

review, lab review, review of studies, review of inpatient medication list





History of Present Illness


Patient feeling ok at rest. Ambulating in hallways earlier and reported 

worsening dyspnea. Requiring supplemental O2 with exertion. Denies chest pain. 

Edema and abdominal bloating improved.





Allergies


Coded Allergies:  


     No Known Allergies (Unverified , 8/14/12)





Social History


Smoking Status:  Current Every Day Smoker


Hx Tobacco Use In Past Year?:  Yes


Hx Substance Use - Type And Am:  No





Problem List


Medical Problems:


(1) Acute kidney injury


Status: Acute  





(2) CHF exacerbation


Status: Acute  





(3) COPD exacerbation


Status: Acute  





(4) Hypokalemia


Status: Acute  





(5) Hypoxia


Status: Acute  





(6) Respiratory acidosis


Status: Acute  











Review of Systems


Respiratory:  + cough, + dyspnea on exertion, No sputum, No wheezing, No 

dyspnea at rest, No hemoptysis


Cardiac:  + edema, No chest pain, No palpitations





Physical Exam


Vital Signs





Last Vital Signs Documentation








  Date Time  Temp Pulse Resp B/P (MAP) Pulse Ox O2 Delivery O2 Flow Rate FiO2


 


8/2/18 08:00      Room Air  


 


8/2/18 07:01 36.6 77 18 139/82 (101) 92  3.0 


 


8/2/18 01:37        30











Physical Exam


Constitutional:  


   General Apperance:  obese


   Level of Distress:  NAD, acutely ill, chronically ill


Psychiatric:  


   Mental Status:  active & alert


   Orientation:  to time, to place, to person


Eyes:  


   Pupils:  PERRLA


Neck:  supple


Lungs:  


   Auscultation:  deminished air movement, expiratory wheezing


Cardiovascular:  


   Heart Auscultation:  RRR, no murmurs


Abdomen:  


   Bowel Sounds:  normal


   Inspection & Palpation:  soft, non-distended


Extremities:  edema (2+ pitting edema)





Assessment and Plan


Assessment and Plan


1. Acute respiratory failure with hypoxia secondary to probable acute diastolic 

vs acute right heart failure, gabrielaian


2. Unable to r/o PE - started on IV heparin, transition to long term 

anticoagulation. History of DVT. May benefit from lifelong anticoagulation due 

to immobility


3 COURTNEY with history of non compliance with CPAP


3. COPD wiht ongoing tobacco abuse


4. Patient reports history of DVT, no  longer on anticoagulation therapy. 


5. Hypertension


6. MARGIE with hyperkalemia.


7. Abnormal EKG, negative cardiac enzymes x2, normal LV function at rest.





PLAN: 


Continue IV furosemide with metolazone with ongoing aggressive diuresis. 


Potassium and creatinine stable


Lisinopril on hold. 


Recommend Coumadin for anticoagulations. NOAC's are not advised in patient's 

with BMI >40 in this setting due to unpredictable pharmacokinetics and possible 

lack of efficacy.


CPAP/BIPAP therapy and supplemental O2. 


May need 2Step prior to discharge.


She may benefit from future ischemic work up as outpatient after respiratory 

status and volume status improves. 


She will need to f/u closely with PCP, cardiology, sleep med, and pulmonary 

department upon discharge when she returns to [place]. 





Case discussed with Dr. Romero. Will follow.


CARDIOLOGY ATTENDING ADDENDUM: 


The patient was seen and personally examined.


Agree with Syeda Mcclure PA-C's findings and plans as documented above.








Laboratory Results





Last 24 Hours








Test


  8/1/18


11:10 8/1/18


16:42 8/1/18


20:11 8/1/18


20:12


 


Bedside Glucose 171 mg/dl  299 mg/dl  391 mg/dl  368 mg/dl 


 


Test


  8/2/18


00:02 8/2/18


00:38 8/2/18


04:29 8/2/18


07:24


 


Bedside Glucose 258 mg/dl   152 mg/dl  110 mg/dl 


 


White Blood Count  9.17 K/uL   


 


Red Blood Count  4.84 M/uL   


 


Hemoglobin  14.5 g/dL   


 


Hematocrit  45.3 %   


 


Mean Corpuscular Volume  93.6 fL   


 


Mean Corpuscular Hemoglobin  30.0 pg   


 


Mean Corpuscular Hemoglobin


Concent 


  32.0 g/dl 


  


  


 


 


Platelet Count  240 K/uL   


 


Mean Platelet Volume  9.8 fL   


 


Neutrophils (%) (Auto)  78.2 %   


 


Lymphocytes (%) (Auto)  14.0 %   


 


Monocytes (%) (Auto)  7.4 %   


 


Eosinophils (%) (Auto)  0.0 %   


 


Basophils (%) (Auto)  0.0 %   


 


Neutrophils # (Auto)  7.17 K/uL   


 


Lymphocytes # (Auto)  1.28 K/uL   


 


Monocytes # (Auto)  0.68 K/uL   


 


Eosinophils # (Auto)  0.00 K/uL   


 


Basophils # (Auto)  0.00 K/uL   


 


RDW Standard Deviation  54.9 fL   


 


RDW Coefficient of Variation  16.0 %   


 


Immature Granulocyte % (Auto)  0.4 %   


 


Immature Granulocyte # (Auto)  0.04 K/uL   


 


Activated Partial


Thromboplast Time 


  61.3 SECONDS 


  


  


 


 


Partial Thromboplastin Ratio  2.4   


 


Sodium Level  133 mmol/L   


 


Potassium Level  5.0 mmol/L   


 


Chloride Level  92 mmol/L   


 


Carbon Dioxide Level  36 mmol/L   


 


Anion Gap  5.0 mmol/L   


 


Blood Urea Nitrogen  56 mg/dl   


 


Creatinine  1.39 mg/dl   


 


Est Creatinine Clear Calc


Drug Dose 


  72.3 ml/min 


  


  


 


 


Estimated GFR (


American) 


  49.0 


  


  


 


 


Estimated GFR (Non-


American 


  42.3 


  


  


 


 


BUN/Creatinine Ratio  40.4   


 


Random Glucose  226 mg/dl   


 


Calcium Level  8.9 mg/dl   


 


Magnesium Level  1.7 mg/dl

## 2018-08-02 NOTE — PROGRESS NOTE
DATE: 08/02/2018

 

NEPHROLOGY NOTE

 

SUBJECTIVE:  Overnight, she is doing better.  She is urinating a lot and very

happy with that.  Urine output yesterday was more than 7 liters.  Lower

extremity edema as well as abdominal distention and shortness of breath is

getting better.

 

OBJECTIVE:

VITAL SIGNS:  Blood pressure 139/82, 92% on room air, pulse rate 77 per

minute, respiratory rate 18 per minute, temperature 36.6.

HEENT:  Mucous membranes moist.

NECK:  Supple.  Cannot assess JVD because of obesity.

CHEST:  Decreased breath sounds.  Unable to hear any breath sounds secondary

to morbid obesity.

CARDIOVASCULAR:  S1 and S2, regular.

ABDOMEN:  Soft, nontender, very obese.

EXTREMITIES:  Shows bilateral lower extremity edema, but it is definitely

less than admission.

 

LABORATORY TESTS:  From today was reviewed in detail.  Platelet count 240,

hemoglobin 14.5.  Sodium 133, potassium 5.0, BUN 56, creatinine 1.39.

 

ASSESSMENT AND PLAN:  A 58-year-old female who presented with symptomatic

congestive heart failure including right-sided heart failure, diastolic heart

failure, chronic kidney disease stage III.

 

1.  Acute kidney injury.  Renal function has improved slightly despite

ongoing diuresis, which is actually very good sign.  She did have some

chronic kidney disease prior to this hospitalization, although we do not have

the exact lab results showing the baseline creatinine.  At this time, her BUN

and creatinine are relatively stable.  I will continue with the current dose

of IV Lasix as it seems to be working.  Continue Lasix 80 IV twice daily and

metolazone 5 daily.  If the sodium drops too low meaning less than 130, we

can stop the metolazone at that time.  She gained 70 pounds in the last two

months, so we still have good way to go before we get her to her previous

weight.

 

 

 

 

MTDD

## 2018-08-02 NOTE — PHARMACY PROGRESS NOTE
Pharmacy Glycemic Short Note 2


Date of Service


Aug 2, 2018.





OUTPATIENT ANTIDIABETIC REGIMEN: 


* Metformin 850 mg po TID


* Glimepiride 4 mg po BID


* Sitagliptin 100 mg po daily


* A1c = 9.9 % on 7/30/18




















Test


  8/1/18


16:42 8/1/18


20:11 8/1/18


20:12 8/2/18


00:02


 


Bedside Glucose


  299 mg/dl


(70-90) 391 mg/dl


(70-90) 368 mg/dl


(70-90) 258 mg/dl


(70-90)


 


Test


  8/2/18


00:38 8/2/18


04:29 8/2/18


07:24 8/2/18


11:01


 


Random Glucose


  226 mg/dl


(70-99) 


  


  


 


 


Bedside Glucose


  


  152 mg/dl


(70-90) 110 mg/dl


(70-90) 179 mg/dl


(70-90)











ASSESSMENT:





8/2/18


* Ms. De received 71 units of insulin yesterday


* Remains on prednisone 40 mg daily for COPD exacerbation


* Fasting BSG WNL but postprandial BSGs were extremely elevated last evening.  

Reports from evening pharmacist and RN notes indicate patient was eating 

between meals.


* Will plan to increase NPH dose today but do not want to overcompensate for 

additional po intake that may not happen on a daily basis.  Instead, will 

tighten CF to provide additional correction if necessary and provide a very 

loose CR to provide some additional coverage if po intake is increased.





8/1/18


* Ms. De received 40 units of insulin yesterday


* Fasting BSG WNL but postprandial BSGs are elevated


* She remains on prednisone 40 mg daily.  Will transition to NPH insulin only 

to mimic the PK of the once daily prednisone.  Typically 0.4 units/kg is given 

for prednisone >=40 mg, which would be 64 units.  Will start with 55 units 

since she received 40 units of total insulin yesterday and BSGs were still 

elevated. Will continue correctional insulin in addition to this in case the 

NPH is not enough.





7/31/18


* 57 yo F with acute on chronic respiratory and heart failures currently in ICU


* Patient's HbA1c is not well controlled on 3 oral agents at home - would 

benefit from initiation of insulin as outpatient


* Inpatient BSG's not well controlled (all >200 mg/dL yesterday) - likely 2nd 

steroid-induced hyperglycemia and lower doses of insulin administered (for 

patient's weight)


* AM fasting BSG in range this AM despite lower dose of Lantus yesterday (10 

units administered) - patient would benefit from heavier basal load during day 

to help with post-prandial elevations, but decreased doses overnight to help 

prevent overnight/AM hypoglycemia as steroid effects dissipate.  Will therefore 

switch from Lantus to NPH


* OK to tighten Novolog parameters as all BSG's elevated yesterday despite NPO 

status - anticipate steroid effects to be more pronounced now that diet is 

resumed











PLAN FOR INPATIENT GLYCEMIC CONTROL:


* Continue to hold outpatient oral diabetes medications





* Basal/prandial insulin - increase


 * NPH 70 units (0.45 units/kg) qAM w/ prednisone dose 





* Bolus insulin - tighten, add back loose CR


 * NovoLog per scale ACHS or Q6hrs while NPO


 * Goal Range:  Low 120 mg/dL - High 150 mg/dL


 * Correction Factor:  15 mg/dL/unit


 * Carb ratio: 1 unit per 15 gm CHO








PLAN FOR DISCHARGE:


* A1c elevated at 9.9%.  Patient agreeable to adding once daily insulin to 

regimen.


* Would recommend:


* Lantus (or Basaglar) 30 units qHS


 * Increase by 2 units every 3 days if AM BSG above 150 mg/dL


* D/C glimepiride


* Continue metformin and sitagliptin

## 2018-08-03 VITALS
DIASTOLIC BLOOD PRESSURE: 88 MMHG | TEMPERATURE: 97.7 F | HEART RATE: 82 BPM | OXYGEN SATURATION: 92 % | SYSTOLIC BLOOD PRESSURE: 146 MMHG

## 2018-08-03 VITALS — OXYGEN SATURATION: 89 % | HEART RATE: 78 BPM

## 2018-08-03 VITALS
SYSTOLIC BLOOD PRESSURE: 136 MMHG | OXYGEN SATURATION: 90 % | TEMPERATURE: 97.7 F | HEART RATE: 100 BPM | DIASTOLIC BLOOD PRESSURE: 87 MMHG

## 2018-08-03 VITALS
DIASTOLIC BLOOD PRESSURE: 83 MMHG | SYSTOLIC BLOOD PRESSURE: 132 MMHG | OXYGEN SATURATION: 91 % | HEART RATE: 85 BPM | TEMPERATURE: 97.7 F

## 2018-08-03 VITALS — HEART RATE: 81 BPM | OXYGEN SATURATION: 96 %

## 2018-08-03 VITALS
SYSTOLIC BLOOD PRESSURE: 140 MMHG | OXYGEN SATURATION: 91 % | HEART RATE: 84 BPM | DIASTOLIC BLOOD PRESSURE: 86 MMHG | TEMPERATURE: 97.52 F

## 2018-08-03 VITALS — HEART RATE: 85 BPM | OXYGEN SATURATION: 98 %

## 2018-08-03 VITALS
SYSTOLIC BLOOD PRESSURE: 127 MMHG | OXYGEN SATURATION: 95 % | DIASTOLIC BLOOD PRESSURE: 78 MMHG | HEART RATE: 77 BPM | TEMPERATURE: 98.06 F

## 2018-08-03 VITALS — HEART RATE: 84 BPM | OXYGEN SATURATION: 93 %

## 2018-08-03 VITALS
HEART RATE: 86 BPM | OXYGEN SATURATION: 91 % | DIASTOLIC BLOOD PRESSURE: 87 MMHG | SYSTOLIC BLOOD PRESSURE: 143 MMHG | TEMPERATURE: 97.7 F

## 2018-08-03 VITALS — OXYGEN SATURATION: 96 % | HEART RATE: 83 BPM

## 2018-08-03 VITALS — HEART RATE: 89 BPM | OXYGEN SATURATION: 92 %

## 2018-08-03 VITALS — OXYGEN SATURATION: 93 %

## 2018-08-03 LAB
BUN SERPL-MCNC: 35 MG/DL (ref 7–18)
CALCIUM SERPL-MCNC: 9.2 MG/DL (ref 8.5–10.1)
CO2 SERPL-SCNC: 41 MMOL/L (ref 21–32)
CREAT SERPL-MCNC: 0.91 MG/DL (ref 0.6–1.2)
GLUCOSE SERPL-MCNC: 113 MG/DL (ref 70–99)
POTASSIUM SERPL-SCNC: 3.9 MMOL/L (ref 3.5–5.1)
SODIUM SERPL-SCNC: 132 MMOL/L (ref 136–145)

## 2018-08-03 RX ADMIN — INSULIN ASPART SCH UNITS: 100 INJECTION, SOLUTION INTRAVENOUS; SUBCUTANEOUS at 12:21

## 2018-08-03 RX ADMIN — HUMAN INSULIN SCH UNITS: 100 INJECTION, SUSPENSION SUBCUTANEOUS at 07:56

## 2018-08-03 RX ADMIN — NYSTATIN SCH APPLN: 100000 OINTMENT TOPICAL at 07:45

## 2018-08-03 RX ADMIN — LEVALBUTEROL SCH MG: 1.25 SOLUTION, CONCENTRATE RESPIRATORY (INHALATION) at 06:53

## 2018-08-03 RX ADMIN — FLUTICASONE PROPIONATE AND SALMETEROL XINAFOATE SCH PUFF: 230; 21 AEROSOL, METERED RESPIRATORY (INHALATION) at 07:47

## 2018-08-03 RX ADMIN — LEVALBUTEROL SCH MG: 1.25 SOLUTION, CONCENTRATE RESPIRATORY (INHALATION) at 18:55

## 2018-08-03 RX ADMIN — IPRATROPIUM BROMIDE SCH MG: 0.5 SOLUTION RESPIRATORY (INHALATION) at 14:09

## 2018-08-03 RX ADMIN — INSULIN ASPART SCH UNITS: 100 INJECTION, SOLUTION INTRAVENOUS; SUBCUTANEOUS at 07:55

## 2018-08-03 RX ADMIN — LEVOFLOXACIN SCH MG: 750 TABLET, FILM COATED ORAL at 10:54

## 2018-08-03 RX ADMIN — INSULIN ASPART SCH UNITS: 100 INJECTION, SOLUTION INTRAVENOUS; SUBCUTANEOUS at 17:27

## 2018-08-03 RX ADMIN — IPRATROPIUM BROMIDE SCH MG: 0.5 SOLUTION RESPIRATORY (INHALATION) at 18:55

## 2018-08-03 RX ADMIN — FLUTICASONE PROPIONATE AND SALMETEROL XINAFOATE SCH PUFF: 230; 21 AEROSOL, METERED RESPIRATORY (INHALATION) at 20:47

## 2018-08-03 RX ADMIN — INSULIN ASPART SCH UNITS: 100 INJECTION, SOLUTION INTRAVENOUS; SUBCUTANEOUS at 21:20

## 2018-08-03 RX ADMIN — ALUMINUM ZIRCONIUM TRICHLOROHYDREX GLY SCH EA: 0.2 STICK TOPICAL at 15:46

## 2018-08-03 RX ADMIN — IPRATROPIUM BROMIDE SCH MG: 0.5 SOLUTION RESPIRATORY (INHALATION) at 02:11

## 2018-08-03 RX ADMIN — FUROSEMIDE SCH MG: 80 TABLET ORAL at 17:23

## 2018-08-03 RX ADMIN — OXYCODONE AND ACETAMINOPHEN PRN TAB: 7.5; 325 TABLET ORAL at 10:54

## 2018-08-03 RX ADMIN — HYDRALAZINE HYDROCHLORIDE PRN MG: 10 TABLET ORAL at 07:46

## 2018-08-03 RX ADMIN — ENOXAPARIN SODIUM SCH MG: 150 INJECTION SUBCUTANEOUS at 07:46

## 2018-08-03 RX ADMIN — NYSTATIN SCH APPLN: 100000 OINTMENT TOPICAL at 20:47

## 2018-08-03 RX ADMIN — FUROSEMIDE SCH MLS/MIN: 10 INJECTION, SOLUTION INTRAMUSCULAR; INTRAVENOUS at 07:45

## 2018-08-03 RX ADMIN — ALUMINUM ZIRCONIUM TRICHLOROHYDREX GLY SCH EA: 0.2 STICK TOPICAL at 07:26

## 2018-08-03 RX ADMIN — METOLAZONE SCH MG: 5 TABLET ORAL at 07:46

## 2018-08-03 RX ADMIN — LEVALBUTEROL SCH MG: 1.25 SOLUTION, CONCENTRATE RESPIRATORY (INHALATION) at 02:12

## 2018-08-03 RX ADMIN — LEVALBUTEROL SCH MG: 1.25 SOLUTION, CONCENTRATE RESPIRATORY (INHALATION) at 14:09

## 2018-08-03 RX ADMIN — OXYCODONE AND ACETAMINOPHEN PRN TAB: 7.5; 325 TABLET ORAL at 23:01

## 2018-08-03 RX ADMIN — IPRATROPIUM BROMIDE SCH MG: 0.5 SOLUTION RESPIRATORY (INHALATION) at 06:53

## 2018-08-03 NOTE — CARDIOLOGY FOLLOW-UP
Subjective


General


Date of Service:


Aug 3, 2018.


Chief Complaint:  resp failure; right heart failure


Pt evaluation today including:  conversation w/ patient, physical exam, chart 

review, lab review, review of studies, review of inpatient medication list





History of Present Illness


Patient feeling much improved. SOB nearly at baseline. No chest pain. Cough 

improving. Edema improving.  No dizziness.





Allergies


Coded Allergies:  


     No Known Allergies (Unverified , 8/14/12)





Social History


Smoking Status:  Current Every Day Smoker


Hx Tobacco Use In Past Year?:  Yes


Hx Substance Use - Type And Am:  No





Problem List


Medical Problems:


(1) Acute kidney injury


Status: Acute  





(2) CHF exacerbation


Status: Acute  





(3) COPD exacerbation


Status: Acute  





(4) Hypokalemia


Status: Acute  





(5) Hypoxia


Status: Acute  





(6) Respiratory acidosis


Status: Acute  











Review of Systems


Respiratory:  + cough, + dyspnea on exertion, No sputum, No wheezing, No 

shortness of breath, No dyspnea at rest, No hemoptysis


Cardiac:  + edema, No chest pain, No orthopnea, No PND, No palpitations





Physical Exam


Vital Signs





Last Vital Signs Documentation








  Date Time  Temp Pulse Resp B/P (MAP) Pulse Ox O2 Delivery O2 Flow Rate FiO2


 


8/3/18 08:00      Nasal Cannula 2.0 





      BiPAP  


 


8/3/18 06:56 36.7 77 17 127/78 (94) 95   


 


8/2/18 16:00        30











Physical Exam


Constitutional:  


   General Apperance:  obese


   Level of Distress:  NAD, acutely ill, chronically ill


Psychiatric:  


   Mental Status:  active & alert


   Orientation:  to time, to place, to person


Eyes:  


   Pupils:  PERRLA


Neck:  supple


Lungs:  


   Auscultation:  no wheezing, no rales/crackles, deminished air movement


Cardiovascular:  


   Heart Auscultation:  RRR, no murmurs


Abdomen:  


   Bowel Sounds:  normal


   Inspection & Palpation:  soft, non-distended


Extremities:  edema (2+ pitting edema)





Assessment and Plan


Assessment and Plan


1. Acute respiratory failure with hypoxia secondary to probable acute diastolic 

vs acute right heart failure, pickwickian


2. Unable to r/o PE - started on IV heparin, transition to long term 

anticoagulation. History of DVT. May benefit from lifelong anticoagulation due 

to immobility


3 COURTNEY with history of non compliance with CPAP


3. COPD with ongoing tobacco abuse


4. Patient reports history of DVT, no  longer on anticoagulation therapy. 


5. Hypertension


6. MARGIE with hyperkalemia improving, with presenting creatinine of 1.84 mg/dL on 

presentation to 0.91 mg/dL, presenting potassium of 6 mmol/L, 3.9 today, 8/3/

2018


7. Abnormal EKG, negative cardiac enzymes x2, normal LV function at rest.





PLAN:


Bicarb trending up, sodium trending down


Received AM dose of metolazone and furosemide. Hold additional metolazone. Hold 

PM dose of IV furosemide


Transition to oral furosemide tomorrow 80 mg


Resume low dose lisinopril on discharge with caution given hyperkalemia


Recommend Coumadin for anticoagulations. NOAC's are not advised in patient's 

with BMI >40 in this setting due to unpredictable pharmacokinetics and possible 

lack of efficacy.


CPAP/BIPAP therapy and supplemental O2. 


May need 2Step prior to discharge.


She may benefit from future ischemic work up as outpatient after respiratory 

status and volume status improves. 


She will need to f/u closely with PCP, cardiology, sleep med, and pulmonary 

department upon discharge when she returns to [place]. 





Case discussed with Dr. Romero. 


CARDIOLOGY ATTENDING ADDENDUM: 


The patient was seen and personally examined.


Agree with Syeda Mcclure PA-C's findings and plans as documented above and 

additions as noted below.  


Subjective: Patient continues to feel subjectively improved.  Denies chest 

discomfort or shortness of breath.  Telemetry reveals stable sinus rhythm 

without additional episodes of SVT.





Exam:


Edema improved


Superficial ecchymosis noted on the anterior aspects of her forearms





Impression:


As noted above


Plan:


Discontinue IV furosemide and metolazone.  Patient had been taking furosemide 

40 mg p.o. twice daily prior to admission.  Will transition to furosemide 80 mg 

daily starting tomorrow, although his kidney function is stable, would consider 

discharge on furosemide 80 mg p.o. twice daily.





Avoid spironolactone given admission with hyperkalemia and acute renal failure.





Patient has a long-standing history of obstructive sleep apnea diagnosed 

approximately 8 years ago, but she has not been tolerant of CPAP in the past.  

She needs follow-up with this regard when she returns to [place], would 

recommend repeat nocturnal polysomnography and initiation of CPAP.





The patient has been started on empiric anticoagulation given findings of 

severe right ventricular chamber size enlargement and right ventricular 

hypokinesis, severe pulmonary hypertension with PA systolic pressures in the 70 

mmHg range by echocardiogram, cor pulmonale physiology, with past history of 

remote DVT, and PE cannot be excluded this admission.  The patient is currently 

on full anticoagulation dose Lovenox at 150 mg subcutaneously every 12 hours.  

Initial recommendation was for her to be discharged on Coumadin.  The patient 

however apparently has declined this.


She had been on Xarelto in the past.  Been as discussed previously the 

pharmacokinetics of this medication are not well defined in patients with BMI 

over 40 and weight over 120 kg, and this patient's body mass index is closer to 

51 with weight of 148 kg.  In addition to the patient's elevated BMI and weight

, she has had acute renal insufficiency.  Ideally would recommend Coumadin.  

The patient declines, would consider Eliquis although I be worried that she may 

be underdosed given her weight with decreased anticoagulant effect.





Guarding the patient's EKG.  Her right bundle branch block is consistent with 

her perhaps chronic right ventricular dysfunction and obstructive sleep apnea 

hypoventilation syndrome.  She did have diffuse ST segment depression during 

his hospital stay.  No matilde angina.  Serial troponin levels negative.  

Recommend repeat EKG tomorrow and that she is much improved respiratory status 

standpoint, volume standpoint, and electrolyte standpoint.





Laboratory Results





Last 24 Hours








Test


  8/2/18


16:15 8/2/18


20:26 8/3/18


07:08 8/3/18


07:15


 


Bedside Glucose 375 mg/dl  279 mg/dl   115 mg/dl 


 


Sodium Level   132 mmol/L  


 


Potassium Level   3.9 mmol/L  


 


Chloride Level   88 mmol/L  


 


Carbon Dioxide Level   41 mmol/L  


 


Anion Gap   4.0 mmol/L  


 


Blood Urea Nitrogen   35 mg/dl  


 


Creatinine   0.91 mg/dl  


 


Est Creatinine Clear Calc


Drug Dose 


  


  105.1 ml/min 


  


 


 


Estimated GFR (


American) 


  


  81.7 


  


 


 


Estimated GFR (Non-


American 


  


  70.5 


  


 


 


BUN/Creatinine Ratio   38.7  


 


Random Glucose   113 mg/dl  


 


Calcium Level   9.2 mg/dl

## 2018-08-03 NOTE — PROGRESS NOTE
Medicine Progress Note


Date & Time of Visit:


Aug 3, 2018 at 15:00


.


Subjective





CC: 


Follow-up visit for respiratory failure and other problems.





HPI: 


Cough improved.


Dyspnea with exertion.


No chest pain.


Less edema.





ROS:


General- no fever, no chills


Resp- as noted above in HPI


Cardiac-  as noted above in HPI


GI- no nausea, no vomiting, no diarrhea


- voiding without difficulty


.





Objective





Last 8 Hrs








  Date Time  Temp Pulse Resp B/P (MAP) Pulse Ox O2 Delivery O2 Flow Rate FiO2


 


8/3/18 19:57 36.5 100 19 136/87 (103) 90 Nasal Cannula  


 


8/3/18 18:55  85 20  98 Nasal Cannula 3.0 


 


8/3/18 16:04 36.5 85 21 132/83 (99) 91 Nasal Cannula 2.0 


 


8/3/18 14:09  89 20  92 Nasal Cannula 3.0 








Physical Exam:





General- sitting in chair; no acute distress


Lungs- diffuse mild wheezing


Cardiovascular- distant heart sounds; RRR; no murmur or gallop appreciated; 

neck veins difficulty to assess; 2+ pretibial edema


Abdomen- obese, + bowel sounds, soft, nontender 


Extremities- no calf tenderness 


Neuro- alert, oriented


Skin- warm & dry


.


Laboratory Results:





Last 24 Hours








Test


  8/3/18


07:08 8/3/18


07:15 8/3/18


11:23 8/3/18


13:51


 


Sodium Level 132 mmol/L    


 


Potassium Level 3.9 mmol/L    


 


Chloride Level 88 mmol/L    


 


Carbon Dioxide Level 41 mmol/L    


 


Anion Gap 4.0 mmol/L    


 


Blood Urea Nitrogen 35 mg/dl    


 


Creatinine 0.91 mg/dl    


 


Est Creatinine Clear Calc


Drug Dose 105.1 ml/min 


  


  


  


 


 


Estimated GFR (


American) 81.7 


  


  


  


 


 


Estimated GFR (Non-


American 70.5 


  


  


  


 


 


BUN/Creatinine Ratio 38.7    


 


Random Glucose 113 mg/dl    


 


Calcium Level 9.2 mg/dl    


 


Bedside Glucose  115 mg/dl  221 mg/dl  


 


Arterial Blood pH    7.48 


 


Arterial Blood Partial


Pressure CO2 


  


  


  58 mmHg 


 


 


Arterial Blood Partial


Pressure O2 


  


  


  63 mm/Hg 


 


 


Arterial Blood HCO3    43 mmol/L 


 


Arterial Blood Oxygen


Saturation 


  


  


  93.0 % 


 


 


Arterial Blood Base Excess    15.7 mEq/L 


 


Arterial Blood Gas Delivery    3L 


 


Chris Test    POS 


 


Test


  8/3/18


16:55 8/3/18


21:02 


  


 


 


Bedside Glucose 317 mg/dl  225 mg/dl   











Assessment & Plan





ACUTE ON CHRONIC RESPIRATORY FAILURE


Acute hypoxic and hypercapnic respiratory failure.


Suspect some degree of chronicity.


Hypoxia could have been secondary to CHF and COPD.


Considered pulmonary embolism.


Did not pursue CT of chest due to renal insufficiency.


Venous duplex lower extremities negative for DVT.


Hypercapnia probably secondary to combination of COPD, obesity hypoventilation 

syndrome.


Specific problems addressed below.


BiPAP utilized for ventilatory support.


Respiratory status improved.





SLEEP APNEA


Previously diagnosed.


Continue BiPAP when sleeping.


Encourage use of CPAP at home.





ACUTE COPD EXACERBATION


Completed course of levofloxacin.


Continue steroids, bronchodilators.





PULMONARY HYPERTENSION


Estimated PA pressure in 70's per echo.


RV dilated with decreased systolic function.


Pulmonary hypertension could be secondary to COPD, sleep apnea, obesity 

hypoventilation syndrome, or possible chronic thromboembolic disease.


Patient does not wish to consider right-sided cath at this time.


Optimize pulmonary issues as noted above.


Empiric coagulation for possible chronic thromboembolic disease.





CHF


Presented with weight gain and shortness of breath.


Chest x-ray demonstrated cardiomegaly and pulmonary vascular congestion.


BNP was elevated.


Echo demonstrated grossly normal LV systolic function; dilated RV with 

decreased systolic function.


Suspect acute on chronic left ventricular diastolic heart failure.


Suspect acute on chronic right ventricular heart failure secondary to 

underlying pulmonary diseases.


Old records from [place] requested for baseline data.


Received IV furosemide.


Transition to oral furosemide.





ABNORMAL EKG


EKG demonstrated nonspecific changes suggesting ischemia.


Cardiology consulted.


Serum troponins negative x 3.


No apparent left ventricular segmental wall motion abnormalities on echo.





HYPERTENSION


Lisinopril stopped due to hyperkalemia.


Follow and titrate therapy.





RENAL INSUFFICIENCY


Serum creatinine at time of admission was 1.84.


Baseline creatinine unknown.


Nephrology consulted.


Uses ibuprofen PRN-discontinued.


Creatinine this morning = 0.91.





HYPERKALEMIA


Serum potassium 5.9 at time of admission and jimmy as high as 6.0.


Hyperkalemia probably multifactorial- ACE inhibitor, respiratory acidosis, 

renal insufficiency.


ACE inhibitor discontinued.


K this morning = 3.9.


Follow.





DM TYPE 2


History of diabetes mellitus type 2, usually managed with metformin, glimepiride

, sitagliptin.


Random glucose in ED 99.


Hemoglobin A1c 9.9.


Hold oral agents during hospital stay.


Pharmacy consulted for glycemic management.


Novolin N/NovoLog per protocol.


FBS this morning = 115.





MORBID OBESITY


Wt 162 kg, BMI 55.9.


AHA, diabetic diet.





GENERAL DEBILITATION


PT / OT.





INCOMPLETE DATA


Records from [place] requested.





VTE PROPHYLAXIS / HISTORY OF DVT


Initially received SQ heparin.


Started on IV heparin for possible thromboembolic disease.


At long-term risk for recurrent DVT.


Transition from IV heparin to SQ enoxaparin.


DOAC's not appropriate because of weight; warfarin preferred oral anticoagulant.


Patient prefers not to take warfarin.





DISPOSITION


Discharge disposition to be determined.


Patient feels that she would benefit from inpatient rehab.


Case Management consulted.


.


Current Inpatient Medications:





Current Inpatient Medications








 Medications


  (Trade)  Dose


 Ordered  Sig/Vivi


 Route  Start Time


 Stop Time Status Last Admin


Dose Admin


 


 Acetaminophen


  (Tylenol Tab)  650 mg  Q4H  PRN


 PO  7/29/18 23:15


 8/28/18 23:14   


 


 


 Al Hydrox/Mg


 Hydrox/Simethicone


  (Maalox Max Susp)  15 ml  Q4H  PRN


 PO  7/29/18 23:15


 8/28/18 23:14   


 


 


 Ondansetron HCl


  (Zofran Inj)  4 mg  Q6H  PRN


 IV  7/29/18 23:15


 8/28/18 23:14   


 


 


 Nitroglycerin


  (Nitrostat Tab)  0.4 mg  UD  PRN


 SL  7/29/18 23:15


 8/28/18 23:14   


 


 


 Polyethylene


  (Miralax Powder


 Packet)  17 gm  DAILY  PRN


 PO  7/29/18 23:15


 8/28/18 23:14   


 


 


 Gabapentin


  (Neurontin Tab)  800 mg  BID  PRN


 PO  7/29/18 23:15


 8/28/18 23:14 Future Hold  


 


 


 Oxycodone/


 Acetaminophen


  (Percocet


 7.5-325MG Tab)  1 tab  Q8  PRN


 PO  7/29/18 23:15


 8/12/18 23:14  8/3/18 10:54


1 TAB


 


 Miscellaneous


 Information


  (Order Awaiting


 Action)  1 ea  QS


 N/A  7/30/18 08:00


 8/29/18 07:59   


 


 


 Prednisone


  (PredniSONE TAB)  40 mg  DAILY


 PO  7/30/18 09:00


 8/29/18 08:59  8/3/18 07:45


40 MG


 


 Insulin Aspart


  (novoLOG ASPART)  **SLIDING


 SCALE**


 **G...  ACHS


 SC  7/30/18 07:00


 8/29/18 06:59  8/3/18 21:20


8 UNITS


 


 Hydralazine HCl


  (Apresoline Tab)  10 mg  Q6  PRN


 PO  7/29/18 23:15


 8/28/18 23:14  8/3/18 07:46


10 MG


 


 Glucose


  (Glucose 40% Gel)  15-30


 GRAMS 15


 GRAMS...  UD  PRN


 PO  7/29/18 23:45


 8/28/18 23:44   


 


 


 Glucose


  (Glucose Chew


 Tab)  4-8


 Tablets 4


 Tabl...  UD  PRN


 PO  7/29/18 23:45


 8/28/18 23:44   


 


 


 Dextrose


  (Dextrose 50%


 50ML Syringe)  25-50ML


 25ML FOR


 ...  UD  PRN


 IV  7/29/18 23:45


 8/28/18 23:44   


 


 


 Glucagon


  (Glucagon Inj)  1 mg  UD  PRN


 IM  7/29/18 23:45


 8/28/18 23:44   


 


 


 Carbohydrates


  (Carbohydrates


 For Hypoglycemia)  15-30 GRAMS


 15 grams if


 BSG 54-69...  UD  PRN


 PO  7/29/18 23:45


 8/28/18 23:44   


 


 


 Levofloxacin


  (Consult)  1 ea  UD  PRN


 N/A  7/30/18 01:15


 8/29/18 01:14   


 


 


 Nystatin


  (Mycostatin Oint)  1 appln  BID


 EXT  7/30/18 09:00


 8/29/18 08:59  8/3/18 20:47


1 APPLN


 


 Miscellaneous


 Information


  (Consult


 Glycemic


 Management


 Pharmacy)  1 ea  UD  PRN


 N/A  7/31/18 08:58


 8/30/18 08:57   


 


 


 Ipratropium


 Bromide


  (Atrovent 0.02%


 0.5MG/2.5ML Neb)  0.5 mg  Q6R


 INH  8/2/18 03:00


 9/1/18 02:59  8/3/18 18:55


0.5 MG


 


 Levalbuterol


  (Xopenex 1.25MG/


 0.5ML Neb)  1.25 mg  Q6R


 INH  8/2/18 03:00


 9/1/18 02:59  8/3/18 18:55


1.25 MG


 


 Ipratropium


 Bromide


  (Atrovent 0.02%


 0.5MG/2.5ML Neb)  0.5 mg  Q4H  PRN


 INH  8/2/18 01:45


 9/1/18 01:44   


 


 


 Levalbuterol


  (Xopenex 1.25MG/


 0.5ML Neb)  1.25 mg  Q4H  PRN


 INH  8/2/18 01:45


 9/1/18 01:44   


 


 


 Enoxaparin Sodium


  (Lovenox Inj)  150 mg  DAILY@0900


 SQ  8/2/18 09:00


 9/1/18 08:59  8/3/18 07:46


150 MG


 


 Insulin Human NPH


  (novoLIN-N NPH)  70 units  QDB


 SC  8/2/18 07:30


 9/1/18 07:29  8/3/18 07:56


70 UNITS


 


 Salmeterol


 Xinafoate/


 Fluticasone


  (Advair Hfa 230/


 21 Inh)  2 puff  BID


 INH  8/3/18 09:00


 9/2/18 08:59  8/3/18 20:47


2 PUFF


 


 Furosemide


  (Lasix Tab)  80 mg  BID17


 PO  8/3/18 17:00


 9/2/18 16:59  8/3/18 17:23


80 MG

## 2018-08-03 NOTE — PHARMACY PROGRESS NOTE
Pharmacy Glycemic Short Note 2


Date of Service


Aug 3, 2018.





OUTPATIENT ANTIDIABETIC REGIMEN: 


* Metformin 850 mg po TID


* Glimepiride 4 mg po BID


* Sitagliptin 100 mg po daily


* A1c = 9.9 % on 7/30/18











Item Value  Date Time


 


Bedside Glucose 258 mg/dl H 8/2/18 0002


 


Bedside Glucose 152 mg/dl H 8/2/18 0429


 


Bedside Glucose 110 mg/dl H 8/2/18 0724


 


Bedside Glucose 179 mg/dl H 8/2/18 1101


 


Bedside Glucose 375 mg/dl *H 8/2/18 1615


 


Bedside Glucose 279 mg/dl H 8/2/18 2026


 


Bedside Glucose 115 mg/dl H 8/3/18 0715





























ASSESSMENT:


* Pt is currently receiving weight based/prednisone dose based NPH insulin + 

Novolog for steroid induced hyperglycemia + baseline DM


 * NPH insulin is used to counteract the hyperglycemic effect of prednisone. 

The rationale for this approach is that the pharmacodynamics profile of NPH, 

with a peak effect of 4-8hrs and duration of action of 12-16hrs, mirrors the 

pharmacodynamics of prednisone. NPH should be dosed at the same time that 

prednisone is given


 * The dose of NPH given is dependent on the steroid dose given


 * For doses of prednisone 40mg/day or above  NPH dose should be 0.4 units/kg


 * NPH dosing above is given in addition to patients basal insulin needs


 * Typically, patients will also need rapid-acting insulin with meals


* Pre-dinner BSG significantly elevated at 375 mg/dl secondary to conservative 

CR ordered when NPH dose increased yesterday. CR tightened last evening when 

nursing called pharmacy to report critical hyperglycemia. 


* AM fasting BSG in goal range. No changes needed to NPH dosing. 














PLAN FOR INPATIENT GLYCEMIC CONTROL:


* Continue to hold outpatient oral diabetes medications





* Basal/prandial insulin - no change at this time


 * NPH 70 units (0.45 units/kg) qAM w/ prednisone dose 


 * Will decrease dosing with each step down in steroid dosing 





* Bolus insulin - tighten


 * NovoLog per scale ACHS or Q6hrs while NPO


 * Goal Range:  Low 120 mg/dL - High 150 mg/dL


 * Correction Factor:  15 mg/dL/unit


 * Carb ratio: 1 unit per 10 gm CHO








PLAN FOR DISCHARGE:


* A1c elevated at 9.9%.  Patient agreeable to adding once daily insulin to 

regimen.


* Would recommend:


* Lantus (or Basaglar) vs NPH depending on insulin coverage. Recommend starting 

at 30 units qHS


 * Increase by 2 units every 3 days if AM BSG above 150 mg/dL


* D/C glimepiride


* Continue metformin and sitagliptin

## 2018-08-04 VITALS
OXYGEN SATURATION: 95 % | SYSTOLIC BLOOD PRESSURE: 144 MMHG | HEART RATE: 89 BPM | DIASTOLIC BLOOD PRESSURE: 92 MMHG | TEMPERATURE: 97.88 F

## 2018-08-04 VITALS — OXYGEN SATURATION: 95 % | HEART RATE: 89 BPM

## 2018-08-04 VITALS
OXYGEN SATURATION: 90 % | HEART RATE: 91 BPM | DIASTOLIC BLOOD PRESSURE: 82 MMHG | SYSTOLIC BLOOD PRESSURE: 142 MMHG | TEMPERATURE: 98.06 F

## 2018-08-04 VITALS
DIASTOLIC BLOOD PRESSURE: 92 MMHG | SYSTOLIC BLOOD PRESSURE: 144 MMHG | TEMPERATURE: 97.88 F | OXYGEN SATURATION: 92 % | HEART RATE: 95 BPM

## 2018-08-04 VITALS
TEMPERATURE: 98.24 F | DIASTOLIC BLOOD PRESSURE: 78 MMHG | HEART RATE: 93 BPM | SYSTOLIC BLOOD PRESSURE: 117 MMHG | OXYGEN SATURATION: 92 %

## 2018-08-04 VITALS
SYSTOLIC BLOOD PRESSURE: 128 MMHG | TEMPERATURE: 97.88 F | HEART RATE: 89 BPM | OXYGEN SATURATION: 91 % | DIASTOLIC BLOOD PRESSURE: 83 MMHG

## 2018-08-04 VITALS — HEART RATE: 86 BPM | OXYGEN SATURATION: 96 %

## 2018-08-04 VITALS — HEART RATE: 87 BPM | OXYGEN SATURATION: 92 %

## 2018-08-04 LAB
BUN SERPL-MCNC: 39 MG/DL (ref 7–18)
CALCIUM SERPL-MCNC: 9.6 MG/DL (ref 8.5–10.1)
CO2 SERPL-SCNC: 45 MMOL/L (ref 21–32)
CREAT SERPL-MCNC: 1.07 MG/DL (ref 0.6–1.2)
GLUCOSE SERPL-MCNC: 139 MG/DL (ref 70–99)
POTASSIUM SERPL-SCNC: 4 MMOL/L (ref 3.5–5.1)
SODIUM SERPL-SCNC: 130 MMOL/L (ref 136–145)

## 2018-08-04 RX ADMIN — HYDRALAZINE HYDROCHLORIDE PRN MG: 10 TABLET ORAL at 08:10

## 2018-08-04 RX ADMIN — OXYCODONE AND ACETAMINOPHEN PRN TAB: 7.5; 325 TABLET ORAL at 08:17

## 2018-08-04 RX ADMIN — NYSTATIN SCH APPLN: 100000 OINTMENT TOPICAL at 08:11

## 2018-08-04 RX ADMIN — IPRATROPIUM BROMIDE SCH MG: 0.5 SOLUTION RESPIRATORY (INHALATION) at 07:29

## 2018-08-04 RX ADMIN — ALUMINUM ZIRCONIUM TRICHLOROHYDREX GLY SCH EA: 0.2 STICK TOPICAL at 08:00

## 2018-08-04 RX ADMIN — IPRATROPIUM BROMIDE SCH MG: 0.5 SOLUTION RESPIRATORY (INHALATION) at 14:07

## 2018-08-04 RX ADMIN — INSULIN ASPART SCH UNITS: 100 INJECTION, SOLUTION INTRAVENOUS; SUBCUTANEOUS at 12:01

## 2018-08-04 RX ADMIN — INSULIN ASPART SCH UNITS: 100 INJECTION, SOLUTION INTRAVENOUS; SUBCUTANEOUS at 08:16

## 2018-08-04 RX ADMIN — LEVALBUTEROL SCH MG: 1.25 SOLUTION, CONCENTRATE RESPIRATORY (INHALATION) at 01:55

## 2018-08-04 RX ADMIN — HUMAN INSULIN SCH UNITS: 100 INJECTION, SUSPENSION SUBCUTANEOUS at 08:17

## 2018-08-04 RX ADMIN — LEVALBUTEROL SCH MG: 1.25 SOLUTION, CONCENTRATE RESPIRATORY (INHALATION) at 14:07

## 2018-08-04 RX ADMIN — FUROSEMIDE SCH MG: 80 TABLET ORAL at 08:10

## 2018-08-04 RX ADMIN — ALUMINUM ZIRCONIUM TRICHLOROHYDREX GLY SCH EA: 0.2 STICK TOPICAL at 00:00

## 2018-08-04 RX ADMIN — ENOXAPARIN SODIUM SCH MG: 150 INJECTION SUBCUTANEOUS at 08:10

## 2018-08-04 RX ADMIN — LEVALBUTEROL SCH MG: 1.25 SOLUTION, CONCENTRATE RESPIRATORY (INHALATION) at 07:29

## 2018-08-04 RX ADMIN — ALUMINUM ZIRCONIUM TRICHLOROHYDREX GLY SCH EA: 0.2 STICK TOPICAL at 15:18

## 2018-08-04 RX ADMIN — IPRATROPIUM BROMIDE SCH MG: 0.5 SOLUTION RESPIRATORY (INHALATION) at 01:55

## 2018-08-04 RX ADMIN — FLUTICASONE PROPIONATE AND SALMETEROL XINAFOATE SCH PUFF: 230; 21 AEROSOL, METERED RESPIRATORY (INHALATION) at 08:11

## 2018-08-04 NOTE — PHARMACY PROGRESS NOTE
Pharmacy Glycemic Short Note 2


Date of Service


Aug 4, 2018.





OUTPATIENT ANTIDIABETIC REGIMEN: 


* Metformin 850 mg po TID


* Glimepiride 4 mg po BID


* Sitagliptin 100 mg po daily


* A1c = 9.9 % on 7/30/18











Item Value  Date Time


 


Bedside Glucose 258 mg/dl H 8/2/18 0002


 


Bedside Glucose 152 mg/dl H 8/2/18 0429


 


Bedside Glucose 110 mg/dl H 8/2/18 0724


 


Bedside Glucose 179 mg/dl H 8/2/18 1101


 


Bedside Glucose 375 mg/dl *H 8/2/18 1615


 


Bedside Glucose 279 mg/dl H 8/2/18 2026














Bedside Glucose 115 mg/dl H 8/3/18 0715


 


Bedside Glucose 221 mg/dl H 8/3/18 1123


 


Bedside Glucose 317 mg/dl H 8/3/18 1655


 


Bedside Glucose 225 mg/dl H 8/3/18 2102


 


Bedside Glucose 143 mg/dl H 8/4/18 0717
































ASSESSMENT:


* Pt is currently receiving weight based/prednisone dose based NPH insulin + 

Novolog for steroid induced hyperglycemia + baseline DM


 * NPH insulin is used to counteract the hyperglycemic effect of prednisone. 

The rationale for this approach is that the pharmacodynamics profile of NPH, 

with a peak effect of 4-8hrs and duration of action of 12-16hrs, mirrors the 

pharmacodynamics of prednisone. NPH should be dosed at the same time that 

prednisone is given


 * The dose of NPH given is dependent on the steroid dose given


 * For doses of prednisone 40mg/day or above  NPH dose should be 0.4 units/kg


 * NPH dosing above is given in addition to patients basal insulin needs


 * Typically, patients will also need rapid-acting insulin with meals


* BSGs trend upwards throughout the day significantly secondary to prednisone. 


 * Pt is ordered NPH 70 units (0.47 units/kg) to cover Prednisone 40mg PO 

daily. She is also ordered a conservative NovoLog scale since NPH dosing is 

slightly above recommended dosing. 


 * Will tighten CR to combat post-prandial hyperglycemia and continue to 

titrate based on BSG trends. May need to increase NPH tomorrow if this 

tightened CR doesnt work today. 


* AM fasting BSG in goal range. No changes needed to NPH dosing. 














PLAN FOR INPATIENT GLYCEMIC CONTROL:


* Continue to hold outpatient oral diabetes medications


 * Will resume Januvia (sitagliptin) since pt tolerating PO intake, renal 

function is stable, and this is unlikely to cause hypoglycemia. Additionally, 

sitagliptin has main effects on post-prandial BSGs which is our problem right 

now. 





* Basal/prandial insulin - no change at this time


 * NPH 70 units (0.47 units/kg) qAM w/ prednisone dose 


 * Will decrease dosing with each step down in steroid dosing 





* Bolus insulin - tighten


 * NovoLog per scale ACHS or Q6hrs while NPO


 * Goal Range:  Low 100 mg/dL - High 150 mg/dL


 * Correction Factor:  15 mg/dL/unit


 * Carb ratio: 1 unit per 5 gm CHO








PLAN FOR DISCHARGE:


* A1c elevated at 9.9%.  Patient agreeable to adding once daily insulin to 

regimen.


* Would recommend:


* Lantus (or Basaglar) vs NPH depending on insulin coverage. Recommend starting 

at 30 units qHS


 * Increase by 2 units every 3 days if AM BSG above 150 mg/dL


* D/C glimepiride


* Continue metformin and sitagliptin

## 2018-08-04 NOTE — PROGRESS NOTE
Medicine Progress Note


Date & Time of Visit:


Aug 4, 2018 at 12:44


.


Subjective





Doing much better.


Has diuresed about 20 kg.


Dyspnea improved.


Ambulating.


Has decided that she does not want rehab and will be returning home to [place] in a 

few days.


Needs O2 to use until she gets back to [place].


.





Objective





Last 8 Hrs








  Date Time  Temp Pulse Resp B/P (MAP) Pulse Ox O2 Delivery O2 Flow Rate FiO2


 


8/4/18 11:44 36.6 95 20 144/92 (109) 92 Nasal Cannula  


 


8/4/18 08:00      Nasal Cannula 2.0 





      BiPAP  


 


8/4/18 07:30  87 18  92 Nasal Cannula 2.0 


 


8/4/18 07:20 36.6 89 18 128/83 (98) 91 Room Air  








Physical Exam:





General- sitting in chair; no acute distress


Lungs- diffuse mild wheezing


Cardiovascular- distant heart sounds; RRR; no murmur or gallop appreciated; 

neck veins difficulty to assess; 1-2+ pretibial edema


Abdomen- obese, + bowel sounds, soft, nontender 


Extremities- no calf tenderness 


Neuro- alert, oriented


Skin- warm & dry


.


Laboratory Results:





Last 24 Hours








Test


  8/3/18


13:51 8/3/18


16:55 8/3/18


21:02 8/4/18


05:30


 


Arterial Blood pH 7.48    


 


Arterial Blood Partial


Pressure CO2 58 mmHg 


  


  


  


 


 


Arterial Blood Partial


Pressure O2 63 mm/Hg 


  


  


  


 


 


Arterial Blood HCO3 43 mmol/L    


 


Arterial Blood Oxygen


Saturation 93.0 % 


  


  


  


 


 


Arterial Blood Base Excess 15.7 mEq/L    


 


Arterial Blood Gas Delivery 3L    


 


Chris Test POS    


 


Bedside Glucose  317 mg/dl  225 mg/dl  


 


Sodium Level    130 mmol/L 


 


Potassium Level    4.0 mmol/L 


 


Chloride Level    82 mmol/L 


 


Carbon Dioxide Level    45 mmol/L 


 


Anion Gap    3.0 mmol/L 


 


Blood Urea Nitrogen    39 mg/dl 


 


Creatinine    1.07 mg/dl 


 


Est Creatinine Clear Calc


Drug Dose 


  


  


  89.2 ml/min 


 


 


Estimated GFR (


American) 


  


  


  67.2 


 


 


Estimated GFR (Non-


American 


  


  


  58.0 


 


 


BUN/Creatinine Ratio    36.4 


 


Random Glucose    139 mg/dl 


 


Calcium Level    9.6 mg/dl 


 


Magnesium Level    1.6 mg/dl 


 


Chemistry Specimen Hemolysis     


 


Test


  8/4/18


07:17 8/4/18


08:58 8/4/18


11:43 


 


 


Bedside Glucose 143 mg/dl   164 mg/dl  


 


Arterial Blood pH  7.48   


 


Arterial Blood Partial


Pressure CO2 


  62 mmHg 


  


  


 


 


Arterial Blood Partial


Pressure O2 


  56 mm/Hg 


  


  


 


 


Arterial Blood HCO3  45 mmol/L   


 


Arterial Blood Oxygen


Saturation 


  89.2 % 


  


  


 


 


Arterial Blood Base Excess  17.7 mEq/L   


 


Arterial Blood Gas Delivery  3 liters   


 


Chris Test  POS   











Assessment & Plan





ACUTE ON CHRONIC RESPIRATORY FAILURE


Acute hypoxic and hypercapnic respiratory failure.


Suspect some degree of chronicity.


Hypoxia could have been secondary to CHF and COPD.


Considered pulmonary embolism.


Did not pursue CT of chest due to renal insufficiency.


Venous duplex lower extremities negative for DVT.


Hypercapnia probably secondary to combination of COPD, obesity hypoventilation 

syndrome.


Specific problems addressed below.


BiPAP utilized for ventilatory support.


Respiratory status improved.





SLEEP APNEA


Previously diagnosed.


Continue BiPAP when sleeping.


Encouraged to use CPAP at home.





ACUTE COPD EXACERBATION


Treated with IV steroids, levofloxacin, bronchodilators.


ABG improved.


Completed course of levofloxacin and steroids.





PULMONARY HYPERTENSION


Estimated PA pressure in 70's per echo.


RV dilated with decreased systolic function.


Pulmonary hypertension could be secondary to COPD, sleep apnea, obesity 

hypoventilation syndrome, or possible chronic thromboembolic disease.


Patient does not wish to consider right-sided cath at this time.


Optimize pulmonary issues as noted above.


Empiric coagulation recommended for possible chronic thromboembolic disease.


Warfarin safest option for anticoagulation, but patient declines.


DOAC's not appropriate primarily due to BMI.


Cardiology and Pulmonary follow-up in [place] recommended,





CHF


Presented with weight gain and shortness of breath.


Chest x-ray demonstrated cardiomegaly and pulmonary vascular congestion.


BNP was elevated.


Echo demonstrated grossly normal LV systolic function; dilated RV with 

decreased systolic function.


Suspect acute on chronic left ventricular diastolic heart failure.


Suspect acute on chronic right ventricular heart failure secondary to 

underlying pulmonary diseases.


Old records from [place] requested for baseline data.


Received IV furosemide.


Transitioned to oral furosemide 80 mg daily + afternoon dose PRN.


Cardiology follow-up in [place] recommended,





ABNORMAL EKG


EKG demonstrated nonspecific changes suggesting ischemia.


Cardiology consulted.


Serum troponins negative x 3.


No apparent left ventricular segmental wall motion abnormalities on echo.


Cardiology follow-up in [place] recommended,





HYPERTENSION


Lisinopril stopped due to hyperkalemia.


Follow and titrate therapy.





RENAL INSUFFICIENCY


Serum creatinine at time of admission was 1.84.


Baseline creatinine unknown.


Nephrology consulted.


Uses ibuprofen PRN-discontinued.


Creatinine day of discharge = 1.07.


Follow.





HYPERKALEMIA


Serum potassium 5.9 at time of admission and jimmy as high as 6.0.


Hyperkalemia probably multifactorial- ACE inhibitor, respiratory acidosis, 

renal insufficiency.


ACE inhibitor discontinued.


K this morning = 4.0.


Follow.





DM TYPE 2


History of diabetes mellitus type 2, usually managed with metformin, glimepiride

, sitagliptin.


Random glucose in ED 99.


Hemoglobin A1c 9.9.


Hold oral agents during hospital stay.


Pharmacy consulted for glycemic management.


Novolin N/NovoLog per protocol.


FBS day of discharge = 143.


Patient has Lantus at home, but has not been using it.


Lantus 20 units SQ HS recommended.


Stop glimepiride.


Continue metformin and sitagliptin. 





MORBID OBESITY


Wt 162 kg, BMI 55.9.


AHA, diabetic diet.





GENERAL DEBILITATION


PT / OT.





INCOMPLETE DATA


Records from [place] requested.





VTE PROPHYLAXIS / HISTORY OF DVT


Initially received SQ heparin.


Started on IV heparin for possible thromboembolic disease.


At long-term risk for recurrent DVT.


Transition from IV heparin to SQ enoxaparin.


DOAC's not appropriate because of weight.


Warfarin would be safest oral anticoagulant, but patient prefers not to take it.





DISPOSITION


Discharged to home.


Follow-up with PCP - Dr. Rosas in [place] as soon as possible.


.


Current Inpatient Medications:





Current Inpatient Medications








 Medications


  (Trade)  Dose


 Ordered  Sig/Vivi


 Route  Start Time


 Stop Time Status Last Admin


Dose Admin


 


 Acetaminophen


  (Tylenol Tab)  650 mg  Q4H  PRN


 PO  7/29/18 23:15


 8/28/18 23:14   


 


 


 Al Hydrox/Mg


 Hydrox/Simethicone


  (Maalox Max Susp)  15 ml  Q4H  PRN


 PO  7/29/18 23:15


 8/28/18 23:14   


 


 


 Ondansetron HCl


  (Zofran Inj)  4 mg  Q6H  PRN


 IV  7/29/18 23:15


 8/28/18 23:14   


 


 


 Nitroglycerin


  (Nitrostat Tab)  0.4 mg  UD  PRN


 SL  7/29/18 23:15


 8/28/18 23:14   


 


 


 Polyethylene


  (Miralax Powder


 Packet)  17 gm  DAILY  PRN


 PO  7/29/18 23:15


 8/28/18 23:14   


 


 


 Gabapentin


  (Neurontin Tab)  800 mg  BID  PRN


 PO  7/29/18 23:15


 8/28/18 23:14 Future Hold  


 


 


 Oxycodone/


 Acetaminophen


  (Percocet


 7.5-325MG Tab)  1 tab  Q8  PRN


 PO  7/29/18 23:15


 8/12/18 23:14  8/4/18 08:17


1 TAB


 


 Miscellaneous


 Information


  (Order Awaiting


 Action)  1 ea  QS


 N/A  7/30/18 08:00


 8/29/18 07:59   


 


 


 Prednisone


  (PredniSONE TAB)  40 mg  DAILY


 PO  7/30/18 09:00


 8/29/18 08:59  8/4/18 08:10


40 MG


 


 Insulin Aspart


  (novoLOG ASPART)  **SLIDING


 SCALE**


 **G...  ACHS


 SC  7/30/18 07:00


 8/29/18 06:59  8/4/18 12:01


12 UNITS


 


 Hydralazine HCl


  (Apresoline Tab)  10 mg  Q6  PRN


 PO  7/29/18 23:15


 8/28/18 23:14  8/4/18 08:10


10 MG


 


 Glucose


  (Glucose 40% Gel)  15-30


 GRAMS 15


 GRAMS...  UD  PRN


 PO  7/29/18 23:45


 8/28/18 23:44   


 


 


 Glucose


  (Glucose Chew


 Tab)  4-8


 Tablets 4


 Tabl...  UD  PRN


 PO  7/29/18 23:45


 8/28/18 23:44   


 


 


 Dextrose


  (Dextrose 50%


 50ML Syringe)  25-50ML


 25ML FOR


 ...  UD  PRN


 IV  7/29/18 23:45


 8/28/18 23:44   


 


 


 Glucagon


  (Glucagon Inj)  1 mg  UD  PRN


 IM  7/29/18 23:45


 8/28/18 23:44   


 


 


 Carbohydrates


  (Carbohydrates


 For Hypoglycemia)  15-30 GRAMS


 15 grams if


 BSG 54-69...  UD  PRN


 PO  7/29/18 23:45


 8/28/18 23:44   


 


 


 Nystatin


  (Mycostatin Oint)  1 appln  BID


 EXT  7/30/18 09:00


 8/29/18 08:59  8/4/18 08:11


1 APPLN


 


 Miscellaneous


 Information


  (Consult


 Glycemic


 Management


 Pharmacy)  1 ea  UD  PRN


 N/A  7/31/18 08:58


 8/30/18 08:57   


 


 


 Ipratropium


 Bromide


  (Atrovent 0.02%


 0.5MG/2.5ML Neb)  0.5 mg  Q6R


 IN[facility]  8/2/18 03:00


 9/1/18 02:59  8/4/18 07:29


0.5 MG


 


 Levalbuterol


  (Xopenex 1.25MG/


 0.5ML Neb)  1.25 mg  Q6R


 IN[facility]  8/2/18 03:00


 9/1/18 02:59  8/4/18 07:29


1.25 MG


 


 Ipratropium


 Bromide


  (Atrovent 0.02%


 0.5MG/2.5ML Neb)  0.5 mg  Q4H  PRN


 IN[facility]  8/2/18 01:45


 9/1/18 01:44   


 


 


 Levalbuterol


  (Xopenex 1.25MG/


 0.5ML Neb)  1.25 mg  Q4H  PRN


 INH  8/2/18 01:45


 9/1/18 01:44   


 


 


 Enoxaparin Sodium


  (Lovenox Inj)  150 mg  DAILY@0900


 SQ  8/2/18 09:00


 9/1/18 08:59  8/4/18 08:10


150 MG


 


 Insulin Human NPH


  (novoLIN-N NPH)  70 units  QDB


 SC  8/2/18 07:30


 9/1/18 07:29  8/4/18 08:17


70 UNITS


 


 Salmeterol


 Xinafoate/


 Fluticasone


  (Advair Hfa 230/


 21 Inh)  2 puff  BID


 INH  8/3/18 09:00


 9/2/18 08:59  8/4/18 08:11


2 PUFF


 


 Furosemide


  (Lasix Tab)  80 mg  BID17


 PO  8/3/18 17:00


 9/2/18 16:59  8/4/18 08:10


80 MG


 


 Sitagliptin


 Phosphate


  (Januvia Tab)  100 mg  DAILY


 PO  8/5/18 09:00


 9/4/18 08:59

## 2018-08-04 NOTE — DISCHARGE INSTRUCTIONS
Discharge Instructions


Date of Service


Aug 4, 2018.





Admission


Reason for Admission:  trouble breathing


.





Discharge


Discharge Diagnosis / Problem:  congestive heart failure, COPD, sleep apnea





Discharge Goals


Goal(s):  Decrease discomfort, Improve function, Increase independence, Improve 

disease control





Activity Recommendations


Activity Limitations:  as noted below


Lifting Limitations:  gradually increase as tolerated





.





Instructions / Follow-Up


Instructions / Follow-Up








APPOINTMENTS:





PRIMARY CARE


Please make an appointment to see Dr. Rosas next week.


He should check lab tests to make sure that your potassium and kidney tests are 

OK.








OTHER INSTRUCTIONS:





Please do not smoke.





New furosemide (Lasix) dose is 80 mg daily.


May take extra dose in the afternoon if you are retaining fluid or gaining 

weight.





Stop taking lisinopril- it can cause high potassium.





Take Lantus 20 units at bedtime.


Continue metformin (Glucophage) and sitagliptin (Januvia).


Stop glimepiride.


Check your blood sugars and keep diary for Dr. Rosas.


He can then help you adjust your Lantus dose.





Stop taking ibuprofen- it can be bad for kidneys and blood pressure.





Your magnesium level was low.


Take magnesium oxide 400 mg twice a day.


Prescription not necessary.





Wear your oxygen essentially all of the time at 2 liters / minute.


Wear CPAP at night.





Ask Dr. Rosas to make referral for you to see a Cardiologist for your heart 

problems and a Pulmonologist for your breathing problems.





Reconsider taking warfarin (Coumadin) to prevent blood clots which could affect 

your breathing.


Please discuss with Dr. Rosas.





Seek medical attention if you have:


*  temperature above 101


*  chest pain or trouble breathing


*  abdominal pain, nausea, vomiting


*  diarrhea, dark stools or bloody stools


*  any unanswered questions or concerns





Call 911 if symptoms are severe.





Call if you have any questions or problems.


My cell # is [phone].





You can also reach a [facility] hospitalist on duty at [facility] 24 hours a day by calling [phone].





Please take good care of yourself.





Jose M Brandon








Call your Primary Care doctor if any of the following symptoms or problems 

start or get worse:





* Shortness of breath or difficulty breathing


* Wake up at night short of breath


* Chest pain


* Cough


* Swelling of your hands, feet, or legs


* More fatigued or tired with your normal activity


* Palpitations - sudden fast heart beats





WEIGHT





* Weigh yourself every morning after using the bathroom.


* Use the same scale.


* Wear the same amount of clothing.


* Write your weight down on a chart.


* Call your Primary Care doctor if you gain more than 2-3 pounds in 1-2 days.





MEDICATIONS





* Use this discharge instruction sheet for medication instructions.


* Take your medications at the time your doctor ordered.


* Do not skip a dose of your medicines.


* If you miss a dose of medicine, take it as soon as possible, but DO NOT 

DOUBLE A DOSE.


* Read your medicine information when you get home.


* Know all of the side effects of your medicine.  If in doubt, ask your 

pharmacist


* Call your Primary Care doctor's office if you have any side effects.


* Be sure all of your doctors know what medicine and herbs you take (including 

cold, flu, and herbal medicine).








Take the following with you to your follow-up doctor appointments:





* Weight Chart


* Medication List


* List of questions





Do not drink excessive alcohol, beer or wine.


.





Current Hospital Diet


Patient's current hospital diet: Diabetes Type 2 Diet, AHA Diet (Heart Healthy)





Discharge Diet


Recommended Diet:  AHA Diet (Heart Healthy), Diabetes Type 2 Diet





Procedures


Procedures Performed:  


ultrasound legs


   no blood clots


echocardiogram


   CHF- enlarged right ventricle with weak muscle 


pulmonary hypertension- high pressure in blood vessels going to the


lungs





Pending Studies


Studies pending at discharge:  no





Laboratory Results





Hemoglobin A1c








Test


  7/30/18


06:15 Range/Units


 


 


Estimated Average Glucose 237   mg/dl


 


Hemoglobin A1c 9.9 H 4.5-5.6  %








Lipid Panel








Test


  7/30/18


06:15 Range/Units


 


 


Triglycerides Level 67  0-150  mg/dl


 


Cholesterol Level 133  0-200  mg/dl


 


HDL Cholesterol 36   mg/dl


 


Cholesterol/HDL Ratio 3.7   


 


LDL Cholesterol, Calculated 84   mg/dl











Medical Emergencies








.


Who to Call and When:





Call 911 or go to the Emergency Room if:





* If at any time you feel your situation is an emergency


* You have tightness or pain in your chest that does not go away with rest or 

Nitroglycerin


* You are very short of breath even with rest





.





Non-Emergent Contact


Non-Emergency issues call your:  Primary Care Provider, Cardiologist, Hospital 

Doctor, Pulmonologist





.


.








"Provider Documentation" section prepared by Jose M Brandon.








.

## 2018-08-04 NOTE — PROGRESS NOTE
DATE: 08/04/2018

 

SUBJECTIVE:  Overnight she is doing good.  She is urinating a lot.  Her

weight has gone down by about 20 kg since admission.  She feels remarkably

better from breathing standpoint and she is getting ready to be discharged.

 

PHYSICAL EXAMINATION:

VITAL SIGNS:  Blood pressure 144/92, 92% on 2 L nasal cannula.  She does need

BIPAP at night for sleep apnea, respiratory rate 20 per minute, temperature

36.6, pulse rate 95 per minute.

NECK:  Short and obese.  Cannot assess JVD.

CHEST:  Decreased breath sounds.  Unable to hear any breath sounds secondary

to morbid obesity.

CARDIOVASCULAR:  S1, S2 regular.

ABDOMEN:  Soft, nontender, very obese.

EXTREMITIES:  Shows bilateral lower extremity edema, but it is definitely

much better than few days ago.

 

LABORATORY TEST:  Platelet count 240.  WBC count 9.17, hemoglobin 14.5. 

Sodium 130, potassium 4.0, BUN 39, creatinine 1.07, carbon dioxide 45,

chloride 82.  Blood gas done yesterday showed slight alkalosis with a pH of

7.48, pCO2 is high at 62, pO2 is 56, bicarbonate is 45.

 

ASSESSMENT AND PLAN:  A 58-year-old female who presented with symptomatic

congestive heart failure including right-sided heart failure, diastolic heart

failure, chronic kidney disease stage III.



1.  Acute kidney injury.  Renal function has essentially improved all the way

almost to normal.



2.  Fluid, electrolyte disorder as well as acid base disorder.  This is going

to be a challenge.  It appears she has chronic respiratory failure with

hypercapnia.  Her pCO2 is very high signifying she has chronic problem from

obstructive sleep apnea and pulmonary hypertension.  At this time, she does

not have a CPAP machine at home.  She claims that she left it in [place] and at this time does not have one.  I would recommend that we would

send her with a BIPAP machine.  As for the diuretics, I would think she would

need at least Lasix 80 mg twice daily.  Most likely even this will not be

enough.  She drinks a massive amount of liquid at home.  I did tell her that

she needs to be on a low salt diet of 2 g per day and fluid restriction of no

more than 2 L per day.

 

 

 

 

[facility]

## 2018-08-06 NOTE — DISCHARGE SUMMARY
Discharge Summary


Date of Service


Aug 6, 2018.





Discharge Summary


Admission Date:


Jul 29, 2018 at 23:14


Discharge Date:  Aug 4, 2018


Discharge Disposition:  Home


Principal Diagnosis:


ACUTE HYPOXIC AND HYPERCAPNIC RESPIRATORY FAILURE





OTHER ACUTE DIAGNOSES:





EXACERBATION COPD


CHF- left sided diastolic + right sided, acute on chronic


ACUTE KIDNEY INJURY


HYPERKALEMIA


.


Secondary Diagnoses/Problems:





Chronic and Resolved Medical Problems:





(1) Asthma


Status: Chronic  





(2) CHF (congestive heart failure)


Status: Chronic  





(3) Diabetes mellitus, type 2


Status: Chronic  





(4) Hypertension


Status: Chronic  





(5) Morbid obesity with BMI of 50.0-59.9, adult


Status: Chronic  





(6) Obesity hypoventilation syndrome


Status: Chronic  





(7) Sleep apnea


Status: Chronic  


.





Procedures:


cardiac monitoring


IV meds


BiPAP


venous duplex lower extremities


.


Consultations:


Cardiology


Critical Care Medicine


Nephrology


.





Medication Reconciliation


New Medications:  


Furosemide (Furosemide) 20 Mg Tab


80 MG PO DAILY, #100 TAB


Take 80 mg (4 pills) every morning.


 May take extra dose in the afternoon if retaining fluid or gaining


 weight.


Insulin Glargine (Lantus Solostar) 100 Unit/Ml Inj


0 SQ HS, #1 VIAL


Dose will vary.


 Start with 20 units at bedtime.


 Adjust dose with help of your doctor.


Magnesium Oxide (Mg Supplement (Magnesium Oxide) 400 Mg Tab


400 MG PO BID, #60 TAB 5 Refills


no prescription necessary


 


Continued Medications:  


Fluticasone-Salmeterol 230/21 Mcg (Advair Hfa 230/21 Mcg) 1 Aer Aer


2 PUFFS INH BID, AER





Gabapentin (Neurontin) 800 Mg Tab


800 MG PO BID PRN for Pain, TAB





Home O2 Therapy (Oxygen)  Gas


2-3 LITERS NA UD, BTL





Metformin Hcl (Glucophage) 850 Mg Tab


850 MG PO TID, TAB





Oxycodone/Acetaminophen 7.5MG/325MG (Oxycodone/Acetaminophen 7.5MG/325MG) 1 Tab 

Tab


1 TAB PO Q8 PRN for Pain, TAB





Sitagliptin Phosphate (Januvia) 100 Mg Tab


100 MG PO DAILY, TAB





Umeclidinium Bromide (Incruse Ellipta) 62.5 Mcg/Inh Inh


1 INHA INH DAILY





 


Discontinued Medications:  


Furosemide (Lasix) 20 Mg Tab


40 MG PO BID PRN for Fluid Retention, TAB





Glimepiride (Amaryl) 4 Mg Tab


4 MG PO BID, TAB





Ibuprofen Tab (Motrin) 800 Mg Tab


1600 MG PO BID PRN for Pain, TAB





Lisinopril (Zestril) 40 Mg Tab


40 MG PO DAILY, TAB











Admission Information


HPI (per Admitting provider):





This is a 58-year-old female with past medical


history significant for morbid obesity, obstructive sleep apnea, but


noncompliant with CPAP, diabetes, hypertension, COPD, tobacco abuse. 


Presents with shortness of breath.  Patient is from [place].  Her


house is getting repaired and that is why she came to live with her 


 niece in [place] since about a month.  Before coming to [place], she was in ER in [place]  and she was told she has CHF


and discharged on Lasix tablet prn and she was not seen by any cardiologist.  

She


states since that time she is progressively getting weight gain and


increasing lower extremity edema, but last 1 week it got progressive worse


and she was getting short of breath even on ambulating few steps, she was


getting nauseous, somewhat dizzy, feeling weak and tired.    She has some cough 

with occasional


white phlegm . All these symptoms prompted her to come to the ER.  In the ER, 

she was


saturating 83% on room air and with  oxygen 6 L she was saturating at 93%


to 94%.  She is worried about her weight gain.  She is feeling very weak and


tired.  She says her abdominal girth is much increased.  She says she gained


about 70 pounds from last May.  Denies any headaches, no blurred visions, no


earache, no runny nose, no sore throat.  She also complained of some


difficulty swallowing.  She says whenever she has Arvizu's, she feels her


food gets stuck in mid of her esophagus and this is going on for last 2


years and she was told that she may need esophageal dilatation and she is


managing with eating small bites.  No abdominal pain.  Normal bowel


movements.  No blood in the stools.  She says some days she micturates a lot and


some days not at all, but no blood in the urine


.


Physical Exam (per Admitting):





GENERAL:  The patient is morbidly obese and mild respiratory distress.


HEENT:  No pallor, no icterus.  Pupils equal, round, and reactive to light.


NECK:  No JVD, no neck masses, no carotid bruits.


CARDIOVASCULAR:  S1, S2 heard, regular rate and rhythm, no murmur, no gallop.


RESPIRATORY SYSTEM:  Clear to auscultation bilaterally, was tachypneic


initially but is doing okay now.  Mild bibasilar crackles.  No wheezing.


ABDOMEN:  Soft, bowel sounds present, somewhat edematous, erythema seen in


the pannus.


CENTRAL NERVOUS SYSTEM:  Cranial nerves II through XII grossly intact,


nonfocal.


EXTREMITIES:  Bilateral lower extremity gross edema present.  Some


erythematous changes in the lower extremities.


,





Hospital Course





ACUTE ON CHRONIC RESPIRATORY FAILURE


Acute hypoxic and hypercapnic respiratory failure.


Suspect some degree of chronicity.


Hypoxia could have been secondary to CHF and COPD.


Considered pulmonary embolism.


Did not pursue CT of chest due to renal insufficiency.


Venous duplex lower extremities negative for DVT.


Hypercapnia probably secondary to combination of COPD, obesity hypoventilation 

syndrome.


Specific problems addressed below.


BiPAP utilized for ventilatory support.


Respiratory status improved.





SLEEP APNEA


Previously diagnosed.


Continue BiPAP when sleeping.


Encouraged to use CPAP at home.





ACUTE COPD EXACERBATION


Treated with IV steroids, levofloxacin, bronchodilators.


ABG improved.


Completed course of levofloxacin and steroids.





PULMONARY HYPERTENSION


Estimated PA pressure in 70's per echo.


RV dilated with decreased systolic function.


Pulmonary hypertension could be secondary to COPD, sleep apnea, obesity 

hypoventilation syndrome, or possible chronic thromboembolic disease.


Patient does not wish to consider right-sided cath at this time.


Optimize pulmonary issues as noted above.


Empiric coagulation recommended for possible chronic thromboembolic disease.


Warfarin safest option for anticoagulation, but patient declines.


DOAC's not appropriate primarily due to BMI.


Cardiology and Pulmonary follow-up in [place] recommended,





CHF


Presented with weight gain and shortness of breath.


Chest x-ray demonstrated cardiomegaly and pulmonary vascular congestion.


BNP was elevated.


Echo demonstrated grossly normal LV systolic function; dilated RV with 

decreased systolic function.


Suspect acute on chronic left ventricular diastolic heart failure.


Suspect acute on chronic right ventricular heart failure secondary to 

underlying pulmonary diseases.


Old records from [place] requested for baseline data.


Received IV furosemide.


Transitioned to oral furosemide 80 mg daily + afternoon dose PRN.


Cardiology follow-up in [place] recommended,





ABNORMAL EKG


EKG demonstrated nonspecific changes suggesting ischemia.


Cardiology consulted.


Serum troponins negative x 3.


No apparent left ventricular segmental wall motion abnormalities on echo.


Cardiology follow-up in [place] recommended,





HYPERTENSION


Lisinopril stopped due to hyperkalemia.


Follow and titrate therapy.





RENAL INSUFFICIENCY


Serum creatinine at time of admission was 1.84.


Baseline creatinine unknown.


Nephrology consulted.


Uses ibuprofen PRN-discontinued.


Creatinine day of discharge = 1.07.


Follow.





HYPERKALEMIA


Serum potassium 5.9 at time of admission and jimmy as high as 6.0.


Hyperkalemia probably multifactorial- ACE inhibitor, respiratory acidosis, 

renal insufficiency.


ACE inhibitor discontinued.


K this morning = 4.0.


Follow.





DM TYPE 2


History of diabetes mellitus type 2, usually managed with metformin, glimepiride

, sitagliptin.


Random glucose in ED 99.


Hemoglobin A1c 9.9.


Hold oral agents during hospital stay.


Pharmacy consulted for glycemic management.


Novolin N/NovoLog per protocol.


FBS day of discharge = 143.


Patient has Lantus at home, but has not been using it.


Lantus 20 units SQ HS recommended.


Stop glimepiride.


Continue metformin and sitagliptin. 





MORBID OBESITY


Wt 162 kg, BMI 55.9.


AHA, diabetic diet.





GENERAL DEBILITATION


PT / OT.





INCOMPLETE DATA


Records from [place] requested.





VTE PROPHYLAXIS / HISTORY OF DVT


Initially received SQ heparin.


Started on IV heparin for possible thromboembolic disease.


At long-term risk for recurrent DVT.


Transition from IV heparin to SQ enoxaparin.


DOAC's not appropriate because of weight.


Warfarin would be safest oral anticoagulant, but patient prefers not to take it.





DISPOSITION


Discharged to home.


Follow-up with PCP - Dr. Rosas in [place] as soon as possible.


.


Total time spent on discharge = 45 min.


This includes examination of the patient, discharge planning, medication 

reconciliation, and communication with other providers.


.





Discharge Instructions





Discharge Instructions


Date of Service


Aug 4, 2018.





Admission


Reason for Admission:  trouble breathing


.





Discharge


Discharge Diagnosis / Problem:  congestive heart failure, COPD, sleep apnea





Discharge Goals


Goal(s):  Decrease discomfort, Improve function, Increase independence, Improve 

disease control





Activity Recommendations


Activity Limitations:  as noted below


Lifting Limitations:  gradually increase as tolerated





.





Instructions / Follow-Up


Instructions / Follow-Up








APPOINTMENTS:





PRIMARY CARE


Please make an appointment to see Dr. Rosas next week.


He should check lab tests to make sure that your potassium and kidney tests are 

OK.








OTHER INSTRUCTIONS:





Please do not smoke.





New furosemide (Lasix) dose is 80 mg daily.


May take extra dose in the afternoon if you are retaining fluid or gaining 

weight.





Stop taking lisinopril- it can cause high potassium.





Take Lantus 20 units at bedtime.


Continue metformin (Glucophage) and sitagliptin (Januvia).


Stop glimepiride.


Check your blood sugars and keep diary for Dr. Rosas.


He can then help you adjust your Lantus dose.





Stop taking ibuprofen- it can be bad for kidneys and blood pressure.





Your magnesium level was low.


Take magnesium oxide 400 mg twice a day.


Prescription not necessary.





Wear your oxygen essentially all of the time at 2 liters / minute.


Wear CPAP at night.





Ask Dr. Rosas to make referral for you to see a Cardiologist for your heart 

problems and a Pulmonologist for your breathing problems.





Reconsider taking warfarin (Coumadin) to prevent blood clots which could affect 

your breathing.


Please discuss with Dr. Rosas.





Seek medical attention if you have:


*  temperature above 101


*  chest pain or trouble breathing


*  abdominal pain, nausea, vomiting


*  diarrhea, dark stools or bloody stools


*  any unanswered questions or concerns





Call [phone] if symptoms are severe.





Call if you have any questions or problems.


My cell # is [phone].





You can also reach a [facility] hospitalist on duty at [facility] 24 hours a day by calling [phone].





Please take good care of yourself.





Jose M Brandon








Call your Primary Care doctor if any of the following symptoms or problems 

start or get worse:





* Shortness of breath or difficulty breathing


* Wake up at night short of breath


* Chest pain


* Cough


* Swelling of your hands, feet, or legs


* More fatigued or tired with your normal activity


* Palpitations - sudden fast heart beats





WEIGHT





* Weigh yourself every morning after using the bathroom.


* Use the same scale.


* Wear the same amount of clothing.


* Write your weight down on a chart.


* Call your Primary Care doctor if you gain more than 2-3 pounds in 1-2 days.





MEDICATIONS





* Use this discharge instruction sheet for medication instructions.


* Take your medications at the time your doctor ordered.


* Do not skip a dose of your medicines.


* If you miss a dose of medicine, take it as soon as possible, but DO NOT 

DOUBLE A DOSE.


* Read your medicine information when you get home.


* Know all of the side effects of your medicine.  If in doubt, ask your 

pharmacist


* Call your Primary Care doctor's office if you have any side effects.


* Be sure all of your doctors know what medicine and herbs you take (including 

cold, flu, and herbal medicine).








Take the following with you to your follow-up doctor appointments:





* Weight Chart


* Medication List


* List of questions





Do not drink excessive alcohol, beer or wine.


.





Current Hospital Diet


Patient's current hospital diet: Diabetes Type 2 Diet, AHA Diet (Heart Healthy)





Discharge Diet


Recommended Diet:  AHA Diet (Heart Healthy), Diabetes Type 2 Diet





Procedures


Procedures Performed:  


ultrasound legs


   no blood clots


echocardiogram


   CHF- enlarged right ventricle with weak muscle 


pulmonary hypertension- high pressure in blood vessels going to the


lungs





Pending Studies


Studies pending at discharge:  no





Laboratory Results





Hemoglobin A1c








Test


  7/30/18


06:15 Range/Units


 


 


Estimated Average Glucose 237   mg/dl


 


Hemoglobin A1c 9.9 H 4.5-5.6  %








Lipid Panel








Test


  7/30/18


06:15 Range/Units


 


 


Triglycerides Level 67  0-150  mg/dl


 


Cholesterol Level 133  0-200  mg/dl


 


HDL Cholesterol 36   mg/dl


 


Cholesterol/HDL Ratio 3.7   


 


LDL Cholesterol, Calculated 84   mg/dl











Medical Emergencies








.


Who to Call and When:





Call 911 or go to the Emergency Room if:





* If at any time you feel your situation is an emergency


* You have tightness or pain in your chest that does not go away with rest or 

Nitroglycerin


* You are very short of breath even with rest





.





Non-Emergent Contact


Non-Emergency issues call your:  Primary Care Provider, Cardiologist, Hospital 

Doctor, Pulmonologist





.


.








"Provider Documentation" section prepared by Jose M Brandon.








.


.





Additional Copies To


Magda Rosas MD